# Patient Record
Sex: FEMALE | Race: OTHER | Employment: FULL TIME | URBAN - METROPOLITAN AREA
[De-identification: names, ages, dates, MRNs, and addresses within clinical notes are randomized per-mention and may not be internally consistent; named-entity substitution may affect disease eponyms.]

---

## 2024-04-16 ENCOUNTER — TELEPHONE (OUTPATIENT)
Age: 49
End: 2024-04-16

## 2024-04-16 NOTE — TELEPHONE ENCOUNTER
Pt called in to schedule a new pt appt with Dr. Do. Pt is scheduled for 07/19/2024. Pt is aware and confirmed

## 2024-07-19 ENCOUNTER — OFFICE VISIT (OUTPATIENT)
Dept: ENDOCRINOLOGY | Facility: CLINIC | Age: 49
End: 2024-07-19
Payer: COMMERCIAL

## 2024-07-19 VITALS
WEIGHT: 199 LBS | BODY MASS INDEX: 30.16 KG/M2 | SYSTOLIC BLOOD PRESSURE: 120 MMHG | DIASTOLIC BLOOD PRESSURE: 72 MMHG | HEART RATE: 78 BPM | HEIGHT: 68 IN | OXYGEN SATURATION: 97 %

## 2024-07-19 DIAGNOSIS — E03.9 ACQUIRED HYPOTHYROIDISM: Primary | ICD-10-CM

## 2024-07-19 DIAGNOSIS — E55.9 VITAMIN D DEFICIENCY: ICD-10-CM

## 2024-07-19 DIAGNOSIS — D75.1 POLYCYTHEMIA: ICD-10-CM

## 2024-07-19 PROCEDURE — 99204 OFFICE O/P NEW MOD 45 MIN: CPT | Performed by: INTERNAL MEDICINE

## 2024-07-19 RX ORDER — LEVOTHYROXINE SODIUM 75 MCG
TABLET ORAL
COMMUNITY
Start: 2024-05-28 | End: 2024-07-25 | Stop reason: SDUPTHER

## 2024-07-19 RX ORDER — EPINEPHRINE 0.3 MG/.3ML
0.3 INJECTION SUBCUTANEOUS
COMMUNITY

## 2024-07-19 RX ORDER — ERGOCALCIFEROL 1.25 MG/1
CAPSULE ORAL
COMMUNITY

## 2024-07-19 RX ORDER — LANOLIN ALCOHOL/MO/W.PET/CERES
CREAM (GRAM) TOPICAL DAILY
COMMUNITY

## 2024-07-19 NOTE — ASSESSMENT & PLAN NOTE
- Labs reviewed. At this time she is biochemically euthyroid, however given symptoms, may benefit transitioning to Synthroid.   - Will transition patient to Synthroid 75mcg daily. Samples provided in office.   - She will enroll in [employer] and we will provide prescription when she completes registration as Synthroid has not been covered by her insurance in the past.   - Advised that levotyroxine should be taken on an empty stomach. Should avoid taking medications like iron, calcium, tums, I3jaadrfej, PPI at the same time that may decrease absorption of T4. Should separate administration by 4hrs.  - Advised patient to take Vitamin B12 1000mg daily  - Advised patient to start taking Calcium carbonate 600mg daily  - Advised patient to start taking Vitamin D3 4000 units daily  - Advised patient to take magnesium glycinate 400mg, 1 tablet daily   - Will order thyroid antibodies, celiac panel  - Recheck TFTs in 6 weeks and 4 months.    - Follow up visit in 4 months

## 2024-07-19 NOTE — PROGRESS NOTES
Rosalba Chacon 49 y.o. female MRN: [id number]    Encounter: [id number]      Assessment & Plan   1. Acquired hypothyroidism  Assessment & Plan:  - Labs reviewed. At this time she is biochemically euthyroid, however given symptoms, may benefit transitioning to Synthroid.   - Will transition patient to Synthroid 75mcg daily. Samples provided in office.   - She will enroll in [facility] and we will provide prescription when she completes registration as Synthroid has not been covered by her insurance in the past.   - Advised that levotyroxine should be taken on an empty stomach. Should avoid taking medications like iron, calcium, tums, W6sqfrofwr, PPI at the same time that may decrease absorption of T4. Should separate administration by 4hrs.  - Advised patient to take Vitamin B12 1000mg daily  - Advised patient to start taking Calcium carbonate 600mg daily  - Advised patient to start taking Vitamin D3 4000 units daily  - Advised patient to take magnesium glycinate 400mg, 1 tablet daily   - Will order thyroid antibodies, celiac panel  - Recheck TFTs in 6 weeks and 4 months.    - Follow up visit in 4 months  Orders:  -     Celiac Disease Panel; Future  -     Anti-parietal antibody; Future  -     Anti-thyroglobulin antibody; Future  -     Anti-microsomal antibody; Future  -     TSH, 3rd generation; Future; Expected date: 08/30/2024  -     T4, free; Future; Expected date: 08/30/2024  -     Vitamin D 25 hydroxy; Future; Expected date: 11/19/2024  -     TSH, 3rd generation; Future; Expected date: 11/19/2024  -     T4, free; Future; Expected date: 11/19/2024  2. Polycythemia  Assessment & Plan:  Noted to have elevated Hb and Hct on labs  Endorses history of snoring, but also notes itching and noted to have elevated tryptase.   Has history of anaphylactoid reaction in the past with urticarial symptoms.   Will benefit from evaluation by Hematology for polycytemia vs mastocytosis, although elevated counts could also be  related to undiagnosed GAEL.  Orders:  -     Ambulatory Referral to Hematology / Oncology; Future  3. Vitamin D deficiency  -     Vitamin D 25 hydroxy; Future; Expected date: 11/19/2024          CC:  Hypothyroidism    History of Present Illness     HPI:  Rosalba Chacon is a 49 y.o. female with a PMH of Sjogren's syndrome, hypothyroidism, thyroid nodule, vitamin D deficiency presents for evaluation of hypothyroidism.  Patient was diagnosed with hypothyroidism approximately 15 years ago  Currently on Levothyroxine 75mcg daily. Has not tried Synthroid as it is not covered by her insurance.  She is compliant and takes the medication as prescribed.   She reports a 5-6lb weight gain in the past 6 months, also endorses fatigue, sensitivity to cold, occasional constipation, lethargy and brain fog. Also notes occasional tremors which she attributes to family history of RA. No palpitations, increased sweating, difficulty sleeping, dry skin, hair loss or brittle nails.   Her menses have become irregular over the past year, now every 3-4 months and patient states that she is undergoing menopause. She endorses hot flashes, denies vaginal dryness.     All other systems were reviewed and are negative.    Review of Systems   Constitutional:  Positive for fatigue and unexpected weight change (5-6lbs in the past 6 months). Negative for activity change, appetite change and fever.   HENT:  Negative for congestion, postnasal drip, rhinorrhea, sinus pressure, sore throat, trouble swallowing and voice change.    Eyes:  Negative for photophobia and visual disturbance.   Respiratory:  Negative for cough, chest tightness, shortness of breath and wheezing.         +snoring   Cardiovascular:  Positive for leg swelling (in the setting of varicose veins). Negative for chest pain and palpitations.   Gastrointestinal:  Positive for constipation (occasional). Negative for abdominal distention, abdominal pain, diarrhea, nausea and vomiting.  "  Endocrine: Positive for cold intolerance (cold sensitivity). Negative for heat intolerance, polydipsia and polyuria.   Genitourinary:  Positive for menstrual problem. Negative for difficulty urinating, dysuria, frequency and hematuria.   Musculoskeletal:  Negative for arthralgias, back pain and myalgias.   Skin:  Negative for color change, pallor and rash.   Neurological:  Positive for dizziness (occasional). Negative for facial asymmetry, weakness, light-headedness, numbness and headaches.   Psychiatric/Behavioral:  Negative for agitation, behavioral problems, confusion and dysphoric mood.    All other systems reviewed and are negative.      Historical Information   No past medical history on file.  No past surgical history on file.  Social History   Social History     Substance and Sexual Activity   Alcohol Use Not on file     Social History     Substance and Sexual Activity   Drug Use Not on file     Social History     Tobacco Use   Smoking Status Not on file   Smokeless Tobacco Not on file     Family History: No family history on file.    Meds/Allergies   Current Outpatient Medications   Medication Sig Dispense Refill    EPINEPHrine (EPIPEN) 0.3 mg/0.3 mL SOAJ Inject 0.3 mg into a muscle      Synthroid 75 MCG tablet       vitamin B-12 (VITAMIN B-12) 1,000 mcg tablet Take by mouth daily      Vitamin D, Ergocalciferol, 57755 units CAPS Take by mouth Take once a month       No current facility-administered medications for this visit.     No Known Allergies    Objective   Vitals: Blood pressure 120/72, pulse 78, height 5' 8\" (1.727 m), weight 90.3 kg (199 lb), SpO2 97%.  Physical Exam  Vitals and nursing note reviewed.   Constitutional:       General: She is not in acute distress.     Appearance: Normal appearance. She is not ill-appearing, toxic-appearing or diaphoretic.   HENT:      Head: Normocephalic and atraumatic.      Nose: Nose normal.      Mouth/Throat:      Mouth: Mucous membranes are moist.      " Pharynx: Oropharynx is clear.   Eyes:      General: No scleral icterus.        Right eye: No discharge.         Left eye: No discharge.      Extraocular Movements: Extraocular movements intact.      Conjunctiva/sclera: Conjunctivae normal.   Neck:      Comments: Thyroid not enlarged or tender, no palpable nodules  Cardiovascular:      Rate and Rhythm: Normal rate and regular rhythm.      Pulses: Normal pulses.      Heart sounds: Normal heart sounds. No murmur heard.  Pulmonary:      Effort: Pulmonary effort is normal. No respiratory distress.      Breath sounds: Normal breath sounds. No wheezing, rhonchi or rales.   Abdominal:      General: Abdomen is flat. Bowel sounds are normal. There is no distension.      Palpations: Abdomen is soft.      Tenderness: There is no abdominal tenderness. There is no guarding or rebound.   Musculoskeletal:      Cervical back: Normal range of motion and neck supple.      Right lower leg: Edema present.      Left lower leg: Edema present.      Comments: Trace pitting edema in the lower extremities bilaterally.    Skin:     General: Skin is warm and dry.      Coloration: Skin is not jaundiced or pale.   Neurological:      Mental Status: She is alert and oriented to person, place, and time. Mental status is at baseline.   Psychiatric:         Mood and Affect: Mood normal.         Behavior: Behavior normal.         Thought Content: Thought content normal.         Judgment: Judgment normal.          The history was obtained from the review of the chart, patient.    Lab Results:            Component  Ref Range & Units 7/5/24  6:08 AM 12/22/22  6:32 AM 12/23/21 12:35 PM 6/18/21  7:39 AM 7/10/20  6:57 AM 4/9/19  6:32 AM   TSH  0.45 - 5.33 uIU/mL 0.73 1.66 R 0.02 Low  R, CM 0.79 R 1.63 R, CM 1.23 R, CM        Lab Results   Component Value Date/Time    FREE T4 1.24 (H) 07/05/2024 06:08 AM     Component  Ref Range & Units 7/5/24  6:08 AM   VITAMIN B12  180 - 914 pg/mL 316   CBC AND  "DIFFERENTIAL  Order: [id number]  Component  Ref Range & Units 7/5/24  6:08 AM   Hemoglobin  11.5 - 14.5 g/dL 14.8 High    Hematocrit  35.0 - 43.0 % 44.2 High    WBC  4.0 - 10.0 thou/cmm 5.7   RBC  3.70 - 4.70 mill/cmm 5.16 High    Platelet  140 - 350 thou/cmm 252   MPV  7.5 - 11.3 fL 9.1   MCV  80 - 100 fL 86   MCH  26.0 - 34.0 pg 28.7   MCHC  32.0 - 37.0 g/dL 33.5   RDW  12.0 - 16.0 % 13.2   Differential Type AUTO   Absolute Neutrophils  1.8 - 7.8 thou/cmm 3.8   Absolute Lymphocytes  1.0 - 3.0 thou/cmm 1.3   Absolute Monocytes  0.3 - 1.0 thou/cmm 0.5   Absolute Eosinophils  0.0 - 0.5 thou/cmm 0.1   Absolute Basophils  0.0 - 0.1 thou/cmm 0.0   Neutrophils  % 66   Lymphocytes Manual  % 23   Monocytes  % 8   Eosinophils  % 2   Basophils  % 1       Imaging Studies:       I have personally reviewed pertinent reports.      Portions of the record may have been created with voice recognition software. Occasional wrong word or \"sound a like\" substitutions may have occurred due to the inherent limitations of voice recognition software. Read the chart carefully and recognize, using context, where substitutions have occurred.   "

## 2024-07-19 NOTE — PATIENT INSTRUCTIONS
Please take Vitamin B12 1000mg daily.   Start taking Vitamin D3 4000 ubits daily in the evening  We also recommend taking calcium carbonate (TUMS) 600mg daily with dinner.    Labs were ordered for you during this visit, please get them done at your earliest convenience.  Please also take magnesium glycinate 400mg over the counter 1 tablet daily.   
Ataxic gait

## 2024-07-19 NOTE — ASSESSMENT & PLAN NOTE
Noted to have elevated Hb and Hct on labs  Endorses history of snoring, but also notes itching and noted to have elevated tryptase.   Has history of anaphylactoid reaction in the past with urticarial symptoms.   Will benefit from evaluation by Hematology for polycytemia vs mastocytosis, although elevated counts could also be related to undiagnosed GAEL.

## 2024-07-25 DIAGNOSIS — E03.9 ACQUIRED HYPOTHYROIDISM: Primary | ICD-10-CM

## 2024-07-25 RX ORDER — LEVOTHYROXINE SODIUM 75 MCG
TABLET ORAL
Qty: 90 TABLET | Refills: 3 | Status: SHIPPED | OUTPATIENT
Start: 2024-07-25

## 2024-09-18 ENCOUNTER — TELEPHONE (OUTPATIENT)
Dept: ENDOCRINOLOGY | Facility: CLINIC | Age: 49
End: 2024-09-18

## 2024-09-18 DIAGNOSIS — L29.9 ITCHING: ICD-10-CM

## 2024-09-18 DIAGNOSIS — E03.9 ACQUIRED HYPOTHYROIDISM: Primary | ICD-10-CM

## 2024-09-18 NOTE — TELEPHONE ENCOUNTER
Pt was switched to brand synthroid   Will check TSH and free t4  Also records showed tryptase was elevated in 2021, pt c/o itching 3-4 times a week   Will repeat Tryptase level as well     Sowmya Do

## 2024-09-19 ENCOUNTER — APPOINTMENT (OUTPATIENT)
Dept: LAB | Facility: CLINIC | Age: 49
End: 2024-09-19
Payer: COMMERCIAL

## 2024-09-19 DIAGNOSIS — L29.9 ITCHING: ICD-10-CM

## 2024-09-19 DIAGNOSIS — E55.9 VITAMIN D DEFICIENCY: ICD-10-CM

## 2024-09-19 DIAGNOSIS — E03.9 ACQUIRED HYPOTHYROIDISM: ICD-10-CM

## 2024-09-19 LAB
T4 FREE SERPL-MCNC: 1.13 NG/DL (ref 0.61–1.12)
TSH SERPL DL<=0.05 MIU/L-ACNC: 1.21 UIU/ML (ref 0.45–4.5)

## 2024-09-19 PROCEDURE — 84439 ASSAY OF FREE THYROXINE: CPT

## 2024-09-19 PROCEDURE — 83520 IMMUNOASSAY QUANT NOS NONAB: CPT

## 2024-09-19 PROCEDURE — 36415 COLL VENOUS BLD VENIPUNCTURE: CPT

## 2024-09-19 PROCEDURE — 84443 ASSAY THYROID STIM HORMONE: CPT

## 2024-09-22 ENCOUNTER — TELEPHONE (OUTPATIENT)
Dept: ENDOCRINOLOGY | Facility: CLINIC | Age: 49
End: 2024-09-22

## 2024-09-22 DIAGNOSIS — D75.1 POLYCYTHEMIA: Primary | ICD-10-CM

## 2024-09-22 DIAGNOSIS — L29.9 ITCHING: ICD-10-CM

## 2024-09-22 LAB — TRYPTASE SERPL-MCNC: 7.8 UG/L (ref 2.2–13.2)

## 2024-09-23 NOTE — TELEPHONE ENCOUNTER
Reviewed Tryptase level, it is  normal, however pt was taking Zyrtec which can normalize Tryptase, discussed with pt to stop Zyrtec for 5 days and repeat Tryptase,  Blood work ordered     Sowmya Do

## 2024-09-26 ENCOUNTER — APPOINTMENT (OUTPATIENT)
Dept: LAB | Facility: CLINIC | Age: 49
End: 2024-09-26
Payer: COMMERCIAL

## 2024-09-26 DIAGNOSIS — D75.1 POLYCYTHEMIA: ICD-10-CM

## 2024-09-26 DIAGNOSIS — L29.9 ITCHING: ICD-10-CM

## 2024-09-26 LAB
BASOPHILS # BLD AUTO: 0.04 THOUSANDS/ΜL (ref 0–0.1)
BASOPHILS # BLD AUTO: 0.1 THOU/CMM (ref 0–0.1)
BASOPHILS NFR BLD AUTO: 1 %
BASOPHILS NFR BLD AUTO: 1 % (ref 0–1)
DIFFERENTIAL METHOD BLD: ABNORMAL
EOSINOPHIL # BLD AUTO: 0.1 THOU/CMM (ref 0–0.5)
EOSINOPHIL # BLD AUTO: 0.12 THOUSAND/ΜL (ref 0–0.61)
EOSINOPHIL NFR BLD AUTO: 2 %
EOSINOPHIL NFR BLD AUTO: 2 % (ref 0–6)
ERYTHROCYTE [DISTWIDTH] IN BLOOD BY AUTOMATED COUNT: 12.5 % (ref 11.6–15.1)
ERYTHROCYTE [DISTWIDTH] IN BLOOD BY AUTOMATED COUNT: 13 % (ref 12–16)
HCT VFR BLD AUTO: 45 % (ref 35–43)
HCT VFR BLD AUTO: 46.3 % (ref 34.8–46.1)
HGB BLD-MCNC: 15.2 G/DL (ref 11.5–15.4)
HGB BLD-MCNC: 15.5 G/DL (ref 11.5–14.5)
IMM GRANULOCYTES # BLD AUTO: 0.01 THOUSAND/UL (ref 0–0.2)
IMM GRANULOCYTES NFR BLD AUTO: 0 % (ref 0–2)
LYMPHOCYTES # BLD AUTO: 1.1 THOU/CMM (ref 1–3)
LYMPHOCYTES # BLD AUTO: 1.11 THOUSANDS/ΜL (ref 0.6–4.47)
LYMPHOCYTES NFR BLD AUTO: 15 %
LYMPHOCYTES NFR BLD AUTO: 15 % (ref 14–44)
MCH RBC QN AUTO: 28.5 PG (ref 26.8–34.3)
MCH RBC QN AUTO: 29 PG (ref 26–34)
MCHC RBC AUTO-ENTMCNC: 32.8 G/DL (ref 31.4–37.4)
MCHC RBC AUTO-ENTMCNC: 34.3 G/DL (ref 32–37)
MCV RBC AUTO: 84 FL (ref 80–100)
MCV RBC AUTO: 87 FL (ref 82–98)
MONOCYTES # BLD AUTO: 0.4 THOU/CMM (ref 0.3–1)
MONOCYTES # BLD AUTO: 0.48 THOUSAND/ΜL (ref 0.17–1.22)
MONOCYTES NFR BLD AUTO: 5 %
MONOCYTES NFR BLD AUTO: 7 % (ref 4–12)
NEUTROPHILS # BLD AUTO: 5.47 THOUSANDS/ΜL (ref 1.85–7.62)
NEUTROPHILS # BLD AUTO: 6.1 THOU/CMM (ref 1.8–7.8)
NEUTROPHILS NFR BLD AUTO: 77 %
NEUTS SEG NFR BLD AUTO: 75 % (ref 43–75)
NRBC BLD AUTO-RTO: 0 /100 WBCS
PLATELET # BLD AUTO: 245 THOU/CMM (ref 140–350)
PLATELET # BLD AUTO: 273 THOUSANDS/UL (ref 149–390)
PMV BLD AUTO: 11.2 FL (ref 8.9–12.7)
PMV BLD REES-ECKER: 9.5 FL (ref 7.5–11.3)
RBC # BLD AUTO: 5.33 MILL/CMM (ref 3.7–4.7)
RBC # BLD AUTO: 5.33 MILLION/UL (ref 3.81–5.12)
WBC # BLD AUTO: 7.23 THOUSAND/UL (ref 4.31–10.16)
WBC # BLD AUTO: 7.9 THOU/CMM (ref 4–10)

## 2024-09-26 PROCEDURE — 36415 COLL VENOUS BLD VENIPUNCTURE: CPT

## 2024-09-26 PROCEDURE — 83520 IMMUNOASSAY QUANT NOS NONAB: CPT

## 2024-09-26 PROCEDURE — 85025 COMPLETE CBC W/AUTO DIFF WBC: CPT

## 2024-09-29 LAB
TRYPTASE SERPL-MCNC: 6.7 UG/L (ref 2.2–13.2)
TRYPTASE SERPL-MCNC: 8.2 UG/L

## 2024-11-06 ENCOUNTER — TELEPHONE (OUTPATIENT)
Dept: ENDOCRINOLOGY | Facility: CLINIC | Age: 49
End: 2024-11-06

## 2024-11-07 ENCOUNTER — TELEPHONE (OUTPATIENT)
Dept: ENDOCRINOLOGY | Facility: CLINIC | Age: 49
End: 2024-11-07

## 2024-11-07 ENCOUNTER — TELEMEDICINE (OUTPATIENT)
Dept: ENDOCRINOLOGY | Facility: CLINIC | Age: 49
End: 2024-11-07
Payer: COMMERCIAL

## 2024-11-07 DIAGNOSIS — E53.8 VITAMIN B 12 DEFICIENCY: ICD-10-CM

## 2024-11-07 DIAGNOSIS — K76.0 FATTY LIVER: ICD-10-CM

## 2024-11-07 DIAGNOSIS — E66.9 OBESITY (BMI 30-39.9): Primary | ICD-10-CM

## 2024-11-07 DIAGNOSIS — E03.9 ACQUIRED HYPOTHYROIDISM: ICD-10-CM

## 2024-11-07 DIAGNOSIS — E55.9 VITAMIN D DEFICIENCY: ICD-10-CM

## 2024-11-07 PROCEDURE — 99214 OFFICE O/P EST MOD 30 MIN: CPT | Performed by: INTERNAL MEDICINE

## 2024-11-07 RX ORDER — TIRZEPATIDE 2.5 MG/.5ML
2.5 INJECTION, SOLUTION SUBCUTANEOUS WEEKLY
Qty: 2 ML | Refills: 3 | Status: SHIPPED | OUTPATIENT
Start: 2024-11-07 | End: 2025-02-27

## 2024-11-07 NOTE — PROGRESS NOTES
Virtual Regular Visit  Name: Rosalba Chacon      : 1975      MRN: [id number]  Encounter Provider: Sowmya Do MD  Encounter Date: 2024   Encounter department: [facility] [place]    Verification of patient location:    Patient is located at Home in the following state in which I hold an active license [place]    Assessment & Plan  Obesity (BMI 30-39.9)  She would benefit from glucagon like peptide therapy considering her history of fatty liver as well as BMI of more than 30  Discussed the side effects and benefits of Mounjaro.  Will start Mounjaro 2.5 mg once a week for 4 weeks, then increase the dose to 5 mg once a week after 4 weeks  Pt  has  tried more than 6 months of lifestyle modifications including diet and activity changes and has had insignificant weight loss of less than 1 lb a week. Patient denies personal and family history of MCT and MEN2 tumors. Patient denies personal history of pancreatitis. Side effects discussed but not limited to diarrhea, bloating, constipation, GI upset, heartburn,. Titration and medication administration discussed.      Patient educated on the importance of weight loss and benefits of portion control and dieting.Encouraged whole foods/Mediterranean diet, increasing consumption of vegetables, and avoiding processed/packaged foods/carbs.Patient also educated on the importance of exercise. Encouraged to engage in moderate intensity exercise for 30-50 min at least 3-5 times per week along with glucagon like peptide therapy  Orders:    tirzepatide (Zepbound) 2.5 mg/0.5 mL auto-injector; Inject 0.5 mL (2.5 mg total) under the skin once a week    Acquired hypothyroidism  Continue current dose of Synthroid  Orders:    T4, free; Future    TSH, 3rd generation; Future    Vitamin D deficiency  Continue current dose of vitamin D supplementation 2000 IU daily  Orders:    Vitamin D 25 hydroxy; Future    Vitamin B 12 deficiency  Continue  "vitamin B12 supplementation  Orders:    Vitamin B12; Future    Fatty liver  Start glucagon like peptide therapy, also reinforce dietary changes as well as exercise routine to lose weight which can help with fatty liver         2.  Hypothyroidism-patient is clinically and biochemically euthyroid.  Continue current dose of Synthroid    3.  History of elevated triglycerides-she will need repeat lipid profile by primary care physician      No results found for: \"LDLCALC\"      Encounter provider Sowmya Do MD    The patient was identified by name and date of birth. Roslaba Chacon was informed that this is a telemedicine visit and that the visit is being conducted through the Epic Embedded platform. She agrees to proceed..  My office door was closed. No one else was in the room.  She acknowledged consent and understanding of privacy and security of the video platform. The patient has agreed to participate and understands they can discontinue the visit at any time.    Patient is aware this is a billable service.     History of Present Illness     Rosalba Chacon is a 49 y.o. female who presents with chief complaint of weight gain, and difficulty losing weight despite of trying low-calorie diet, exercise routine such as walking half an hour daily 5 days a week.  She also has history of hypothyroidism for which she takes brand Synthroid 75 mcg daily on empty stomach 1 hour before breakfast  She was referred to hematology oncology as she has complained of itching to rule out mastocytosis however workup was not suggestive of mastocytosis or polycythemia.    Currently she takes vitamin B12 1000 mcg daily, vitamin D 2000 IU daily.  She had abdominal ultrasound which showed fatty liver  She is interested in weight loss medication    mpression:    1. Mild increased hepatic echogenicity reflecting mild fatty infiltration.  2. No cholelithiasis.          Workstation:[id number]  Narrative    History: Polycythemia.    Exam: " Ultrasound of the abdomen complete.    Comparison: None.    Findings:    Liver: The liver is normal in size and measures 13.3 cm in length. Mild  increased hepatic echogenicity reflecting mild fatty infiltration.  Gallbladder: No cholelithiasis. No gallbladder wall thickening or  pericholecystic fluid. Negative reported sonographic Parker's sign.  Bile Ducts: No intra or extrahepatic biliary dilation. The common bile duct is  normal in caliber and measures 3 mm in diameter.  Pancreas: Visualized pancreatic parenchyma appears hyperechoic suggestive of  fatty infiltration. Partially limited evaluation due to bowel gas.  Spleen: Within normal limits. The spleen measures 8.4 cm in length.  Kidneys: Left kidney measures 10.5 cm in length. Right kidney measures 11.7 cm  in length. No evidence of stone or hydronephrosis bilaterally.  Aorta/IVC: The visualized upper abdominal aorta and IVC are normal in caliber.  History obtained from : patient  Review of Systems   Constitutional:  Negative for activity change, diaphoresis, fatigue, fever and unexpected weight change.   HENT: Negative.     Eyes:  Negative for visual disturbance.   Respiratory:  Negative for cough, chest tightness and shortness of breath.    Cardiovascular:  Negative for chest pain, palpitations and leg swelling.   Gastrointestinal:  Negative for abdominal pain, blood in stool, constipation, diarrhea, nausea and vomiting.   Endocrine: Negative for cold intolerance, heat intolerance, polydipsia, polyphagia and polyuria.   Genitourinary:  Negative for dysuria, enuresis, frequency and urgency.   Musculoskeletal:  Negative for arthralgias and myalgias.   Skin:  Negative for pallor, rash and wound.   Allergic/Immunologic: Negative.    Neurological:  Negative for dizziness, tremors, weakness and numbness.   Hematological: Negative.    Psychiatric/Behavioral: Negative.       Pertinent Medical History  fatty liver  Hypothyroidism, elevated triglycerides  Medical  "History Reviewed by provider this encounter:       Past Medical History   History reviewed. No pertinent past medical history.  History reviewed. No pertinent surgical history.  History reviewed. No pertinent family history.  Current Outpatient Medications on File Prior to Visit   Medication Sig Dispense Refill    EPINEPHrine (EPIPEN) 0.3 mg/0.3 mL SOAJ Inject 0.3 mg into a muscle      Synthroid 75 MCG tablet Take 1 tablet daily on empty stomach 1 hour before breakfast and 4-hour apart from vitamin D and calcium supplementation 90 tablet 3    vitamin B-12 (VITAMIN B-12) 1,000 mcg tablet Take by mouth daily      Vitamin D, Ergocalciferol, 54934 units CAPS Take by mouth Take once a month       No current facility-administered medications on file prior to visit.   No Known Allergies       Objective     Ht 5' 8.6\" (1.742 m)   Wt 92.5 kg (204 lb)   BMI 30.48 kg/m²   Physical Exam  Constitutional:       Appearance: Normal appearance.   Musculoskeletal:         General: Normal range of motion.      Cervical back: Normal range of motion and neck supple.   Neurological:      Mental Status: She is alert.         Visit Time  Total Visit Duration: 20 minutes         "

## 2024-11-08 ENCOUNTER — TELEPHONE (OUTPATIENT)
Dept: ENDOCRINOLOGY | Facility: CLINIC | Age: 49
End: 2024-11-08

## 2024-11-08 VITALS — HEIGHT: 69 IN | WEIGHT: 204 LBS | BODY MASS INDEX: 30.21 KG/M2

## 2024-11-08 NOTE — TELEPHONE ENCOUNTER
Reason for call:   [x] Prior Auth  [] Other:     Caller:  [] Patient  [x] Pharmacy  Name: [facility]   Address:   Callback Number:     Medication: Zepbound        Quantity: 2.5mg  Key : [id number]    Ordering Provider:   [] PCP/Provider -   [x] Speciality/Provider - Ernestina          Is the patient's insurance updated in EPIC?   [x] Yes   [] No

## 2024-11-11 NOTE — TELEPHONE ENCOUNTER
PA for Zepbound SUBMITTED to [facility]    via    []CMM-KEY:   [x]Surescripts-Case ID # [id number]  []Availity-Auth ID # NDC #   []Faxed to plan   []Other website   []Phone call Case ID #     [x]PA sent as URGENT    All office notes, labs and other pertaining documents and studies sent. Clinical questions answered. Awaiting determination from insurance company.     Turnaround time for your insurance to make a decision on your Prior Authorization can take 7-21 business days.

## 2024-11-14 NOTE — TELEPHONE ENCOUNTER
PA for Zepbound 2.5mg APPROVED     Date(s) approved 11/12/2024-07/09/2025      Patient advised by          Unable to leave voicemail  [x]MyChart Message  []Phone call   []LMOM  []L/M to call office as no active Communication consent on file  []Unable to leave detailed message as VM not approved on Communication consent       Pharmacy advised by    [x]Fax  []Phone call    Approval letter scanned into Media Yes

## 2024-12-03 ENCOUNTER — PATIENT MESSAGE (OUTPATIENT)
Dept: ENDOCRINOLOGY | Facility: CLINIC | Age: 49
End: 2024-12-03

## 2024-12-03 DIAGNOSIS — E66.9 OBESITY (BMI 30-39.9): Primary | ICD-10-CM

## 2024-12-03 RX ORDER — TIRZEPATIDE 5 MG/.5ML
INJECTION, SOLUTION SUBCUTANEOUS
Qty: 2 ML | Refills: 4 | Status: SHIPPED | OUTPATIENT
Start: 2024-12-03 | End: 2024-12-12 | Stop reason: SDUPTHER

## 2024-12-04 ENCOUNTER — TELEPHONE (OUTPATIENT)
Dept: ENDOCRINOLOGY | Facility: CLINIC | Age: 49
End: 2024-12-04

## 2024-12-04 NOTE — TELEPHONE ENCOUNTER
Reason for call:   [] Refill   [x] Prior Auth  [] Other:     Office:   [] PCP/Provider -   [x] Specialty/Provider - Tish Simpson    Medication: Zepbound     Dose/Frequency: 5 mg weekly     Quantity: 2 ml     Pharmacy: [place]

## 2024-12-05 NOTE — TELEPHONE ENCOUNTER
Patient called and made aware Zepbound is approved. Patient states she will be going on vacation for 3 weeks and not have access to a fridge. Made aware Zepbound can be left unopened at room temperature for up to 21 days as long as temperature does not exceed 86 degrees. Patient verbalized understanding.

## 2024-12-05 NOTE — TELEPHONE ENCOUNTER
PA for Zepbound 5 MG/0.5ML SOLN  APPROVED     Date(s) approved 11/5/24-8/2/25    Case #[id number]    Patient advised by          []MyChart Message  []Phone call   []LMOM  [x]L/M to call office as no active Communication consent on file  []Unable to leave detailed message as VM not approved on Communication consent       Pharmacy advised by    [x]Fax  []Phone call    Approval letter scanned into Media No

## 2024-12-05 NOTE — TELEPHONE ENCOUNTER
PA for Zepbound 5 MG/0.5ML SOLN SUBMITTED to [facility]    via      [x][employer]-Case ID # [id number]      [x]PA sent as URGENT    All office notes, labs and other pertaining documents and studies sent. Clinical questions answered. Awaiting determination from insurance company.     Turnaround time for your insurance to make a decision on your Prior Authorization can take 7-21 business days.

## 2024-12-12 ENCOUNTER — TELEPHONE (OUTPATIENT)
Dept: ENDOCRINOLOGY | Facility: CLINIC | Age: 49
End: 2024-12-12

## 2024-12-12 ENCOUNTER — PATIENT MESSAGE (OUTPATIENT)
Dept: ENDOCRINOLOGY | Facility: CLINIC | Age: 49
End: 2024-12-12

## 2024-12-12 DIAGNOSIS — E66.9 OBESITY (BMI 30-39.9): Primary | ICD-10-CM

## 2024-12-12 DIAGNOSIS — E66.9 OBESITY (BMI 30-39.9): ICD-10-CM

## 2024-12-12 RX ORDER — TIRZEPATIDE 5 MG/.5ML
INJECTION, SOLUTION SUBCUTANEOUS
Qty: 2 ML | Refills: 4 | Status: SHIPPED | OUTPATIENT
Start: 2024-12-12 | End: 2024-12-12 | Stop reason: SDUPTHER

## 2024-12-12 RX ORDER — TIRZEPATIDE 5 MG/.5ML
5 INJECTION, SOLUTION SUBCUTANEOUS WEEKLY
Qty: 6 ML | Refills: 0 | Status: SHIPPED | OUTPATIENT
Start: 2024-12-12 | End: 2024-12-13 | Stop reason: SDUPTHER

## 2024-12-12 RX ORDER — TIRZEPATIDE 5 MG/.5ML
INJECTION, SOLUTION SUBCUTANEOUS
Qty: 2 ML | Refills: 1 | Status: SHIPPED | OUTPATIENT
Start: 2024-12-12 | End: 2024-12-12

## 2024-12-12 NOTE — TELEPHONE ENCOUNTER
Prescription for Zepbound 5 mg, autoinjector was sent to [facility] pharmacy at [facility] as per patient as it was not available in another pharmacy

## 2024-12-13 ENCOUNTER — TELEPHONE (OUTPATIENT)
Dept: ENDOCRINOLOGY | Facility: CLINIC | Age: 49
End: 2024-12-13

## 2024-12-13 DIAGNOSIS — E66.9 OBESITY (BMI 30-39.9): ICD-10-CM

## 2024-12-13 DIAGNOSIS — E66.9 OBESITY (BMI 30-39.9): Primary | ICD-10-CM

## 2024-12-13 RX ORDER — TIRZEPATIDE 5 MG/.5ML
5 INJECTION, SOLUTION SUBCUTANEOUS WEEKLY
Qty: 6 ML | Refills: 1 | Status: SHIPPED | OUTPATIENT
Start: 2024-12-13 | End: 2024-12-13 | Stop reason: ALTCHOICE

## 2024-12-13 RX ORDER — TIRZEPATIDE 5 MG/.5ML
5 INJECTION, SOLUTION SUBCUTANEOUS WEEKLY
Qty: 6 ML | Refills: 1 | Status: SHIPPED | OUTPATIENT
Start: 2024-12-13 | End: 2025-05-30

## 2024-12-13 RX ORDER — PHENTERMINE HYDROCHLORIDE 15 MG/1
15 CAPSULE ORAL EVERY MORNING
Qty: 30 CAPSULE | Refills: 3 | Status: SHIPPED | OUTPATIENT
Start: 2024-12-13 | End: 2024-12-13 | Stop reason: DRUGHIGH

## 2024-12-13 NOTE — TELEPHONE ENCOUNTER
Got a message from patient.  There is a shortage of Zepbound in all other pharmacies. [facility]     Sowmya Do

## 2024-12-13 NOTE — TELEPHONE ENCOUNTER
Spoke with pt   As there is a overall shortage of Zepbound , we will send the Rx for phentermine, 15 mg daily.  Discussed to start with half tablet daily for 2 weeks and then increase the dose to 15 mg daily in the morning.  Also discussed the side effects of medication, and phentermine cannot be taken with Zepbound .  Discussed to avoid driving at least for 1 week after starting phentermine    Sowmya Do

## 2024-12-16 ENCOUNTER — TELEPHONE (OUTPATIENT)
Dept: ENDOCRINOLOGY | Facility: CLINIC | Age: 49
End: 2024-12-16

## 2024-12-16 NOTE — TELEPHONE ENCOUNTER
We need to cancel phentermine as patient has received Zepbound prescription from pharmacy  No need to start prior authorization

## 2024-12-23 ENCOUNTER — TELEPHONE (OUTPATIENT)
Dept: ENDOCRINOLOGY | Facility: CLINIC | Age: 49
End: 2024-12-23

## 2025-01-26 ENCOUNTER — PATIENT MESSAGE (OUTPATIENT)
Dept: ENDOCRINOLOGY | Facility: CLINIC | Age: 50
End: 2025-01-26

## 2025-01-26 DIAGNOSIS — K76.0 FATTY LIVER: ICD-10-CM

## 2025-01-26 DIAGNOSIS — E66.9 OBESITY (BMI 30-39.9): Primary | ICD-10-CM

## 2025-01-27 ENCOUNTER — TELEPHONE (OUTPATIENT)
Dept: ENDOCRINOLOGY | Facility: CLINIC | Age: 50
End: 2025-01-27

## 2025-01-27 RX ORDER — PHENTERMINE HYDROCHLORIDE 15 MG/1
15 CAPSULE ORAL EVERY MORNING
Qty: 30 CAPSULE | Refills: 3 | Status: SHIPPED | OUTPATIENT
Start: 2025-01-27

## 2025-01-27 NOTE — PATIENT COMMUNICATION
Spoke with patient, she stated that she was having side effects to the Zepbound such as constipation, stomach discomfort.  We discussed about starting phentermine, discussed the side effects and benefits  We will try low-dose phentermine 15 mg daily for 3 months and give drug holiday for a few months  Patient is agreeable.  Prescription was sent to [facility] pharmacy

## 2025-01-29 ENCOUNTER — TELEPHONE (OUTPATIENT)
Dept: ENDOCRINOLOGY | Facility: CLINIC | Age: 50
End: 2025-01-29

## 2025-01-31 ENCOUNTER — TELEPHONE (OUTPATIENT)
Dept: ENDOCRINOLOGY | Facility: CLINIC | Age: 50
End: 2025-01-31

## 2025-01-31 NOTE — TELEPHONE ENCOUNTER
Is patient still on Zepbound? I remember from December the provider stated no phentermine due to patient on Zepbound    Please advise

## 2025-02-13 ENCOUNTER — TELEPHONE (OUTPATIENT)
Age: 50
End: 2025-02-13

## 2025-02-14 NOTE — TELEPHONE ENCOUNTER
PA for phentermine 15 MG SUBMITTED to [facility]    via    [][employer]-Case ID # [id number]  []PA sent as URGENT    All office notes, labs and other pertaining documents and studies sent. Clinical questions answered. Awaiting determination from insurance company.     Turnaround time for your insurance to make a decision on your Prior Authorization can take 7-21 business days.

## 2025-02-21 NOTE — TELEPHONE ENCOUNTER
PA for phentermine 15 MG  DENIED    Reason:        Message sent to office clinical pool Yes    Denial letter scanned into Media Yes    Appeal started No (Provider will need to decide if appeal is warranted and send clinical documentation to Prior Authorization Team for initiation.)    **Please follow up with your patient regarding denial and next steps**

## 2025-02-25 ENCOUNTER — EVALUATION (OUTPATIENT)
Dept: PHYSICAL THERAPY | Facility: CLINIC | Age: 50
End: 2025-02-25
Payer: COMMERCIAL

## 2025-02-25 DIAGNOSIS — M25.561 ACUTE PAIN OF RIGHT KNEE: Primary | ICD-10-CM

## 2025-02-25 PROCEDURE — 97112 NEUROMUSCULAR REEDUCATION: CPT | Performed by: PHYSICAL THERAPIST

## 2025-02-25 PROCEDURE — 97161 PT EVAL LOW COMPLEX 20 MIN: CPT | Performed by: PHYSICAL THERAPIST

## 2025-02-25 NOTE — LETTER
2025    Kentrell Brunner MD  [address]  [address]  [facility] [address]    Patient: Rosalba Chacon   YOB: 1975   Date of Visit: 2025     Encounter Diagnosis     ICD-10-CM    1. Acute pain of right knee  M25.561           Dear Dr. Brunner:    Thank you for your recent referral of Rosalba Chacon. Please review the attached evaluation summary from Rosalba's recent visit.     Please verify that you agree with the plan of care by signing the attached order.     If you have any questions or concerns, please do not hesitate to call.     I sincerely appreciate the opportunity to share in the care of one of your patients and hope to have another opportunity to work with you in the near future.       Sincerely,    Andrew Buenrostro, PT      Referring Provider:      I certify that I have read the below Plan of Care and certify the need for these services furnished under this plan of treatment while under my care.                    Kentrell Brunner MD  [address]  [address]  [facility] [address]  Via Fax: [phone]          PT Evaluation     Today's date: 2025  Patient name: Rosalba Chacon  : 1975  MRN: [id number]  Referring provider: Kentrell Brunner MD  Dx:   Encounter Diagnosis     ICD-10-CM    1. Acute pain of right knee  M25.561                      Assessment  Impairments: abnormal muscle firing, abnormal muscle tone, abnormal or restricted ROM, abnormal movement, activity intolerance, impaired balance, impaired physical strength, lacks appropriate home exercise program, pain with function, weight-bearing intolerance, participation limitations and activity limitations  Symptom irritability: moderate    Assessment details: 49 year old female patient reports to PT with B (L>R) knee pain. Patient ambulating with one axillary crutch. No red flags noted. Patient presents with decreased motor coordination of LE musculature as well as decreased L knee ROM as primary  movement impairments. Patient had MRI of both knees which ligamentous injuries primarily in her L knee. Patient opting for non-op at this point. Patient will benefit from skilled PT services to address current impairments and functional limitations to help patient return to her PLOF.       Understanding of Dx/Px/POC: good     Prognosis: good    Goals  STG  1. Patient will be independent with completion of HEP throughout therapy  2. Patient will have full L knee ROM in 4 weeks.  3. Patient will ambulate with normal gait pattern and TKE in 4 weeks.  LTG    1. Patient will stand for prolonged periods without increase in symptoms so patient can complete more household tasks with less difficulty in 6 weeks.  2. Patient will ambulate for prolonged periods without increase in symptoms so patient can navigate throughout the community with less difficulty in 6 weeks.   3. Patient will navigate stairs without increase in difficulty in 6 weeks.     Plan  Patient would benefit from: skilled physical therapy    Planned therapy interventions: abdominal trunk stabilization, activity modification, IASTM, joint mobilization, balance, manual therapy, motor coordination training, neuromuscular re-education, patient/caregiver education, strengthening, stretching, therapeutic activities, therapeutic exercise, home exercise program, functional ROM exercises, flexibility and gait training    Frequency: 2x week  Duration in weeks: 8  Treatment plan discussed with: patient        Subjective Evaluation    History of Present Illness  Mechanism of injury: Patient reports with B knee pain (L>R) from a fall on 2/1/25. Patient ambulating with axillary crutch. Patient is hopeful to get back to dancing.     Patient had MRI which showed for the left knee Status post ACL reconstruction with full-thickness tear of the graft.   Anterior displacement of torn graft fibers along the joint space. Associated   contusions within posterior margin of the right  anterior lateral femoral   condyle. Contusion far medial aspect of medial femoral condyle, Grade 1/2 MCL sprain, Grade 2 LCL sprain with partial tears of the proximal fibers, Posterior lateral corner injury with tearing of popliteal fibular ligament   and posterior lateral corner edema, Moderate knee joint effusion and small Baker's cyst formation.     Patient had MRI for right knee that showed Status post ACL reconstruction, for suspicious for a torn graft near the femoral origin, Partial-thickness patellofemoral chondromalacia, progressive from prior MRI from 2016, Mild proximal patellar tendinosis, without tear, similar .       Patient Goals  Patient goals for therapy: decreased pain, increased motion, improved balance, return to sport/leisure activities, independence with ADLs/IADLs and increased strength    Pain  Current pain ratin  At best pain ratin  At worst pain ratin  Quality: dull ache  Relieving factors: change in position and ice  Aggravating factors: standing, walking, sitting and stair climbing    Treatments  Previous treatment: injection treatment  Current treatment: physical therapy        Objective     Active Range of Motion   Left Knee   Flexion: 110 degrees with pain  Extension: Left knee active extension: lacking 5 deg. with pain    Right Knee   Normal active range of motion    Passive Range of Motion   Left Knee   Flexion: 112 degrees with pain  Extension: Left knee passive extension: lacking 3 deg.     Right Knee   Normal passive range of motion    Strength/Myotome Testing     Left Knee   Quadriceps contraction: good    Right Knee   Quadriceps contraction: good             Precautions: SEE SUBJECTIVE FOR EXTENSIVE KNEE INJURY HISTORY/CURRENT INJURY      Manuals 2/25            L knee PROM             Patellar mobs             B knee mx work As needed                         Neuro Re-Ed             Swing/stance                                                                                            Ther Ex             L heel slides r            L knee extension stretch seated r            SLR r                         bike             Leg press              Supine hip flexor/quad stretch                          Ther Activity                                       Gait Training                                       Modalities

## 2025-02-25 NOTE — PROGRESS NOTES
PT Evaluation     Today's date: 2025  Patient name: Rosalba Chacon  : 1975  MRN: [id number]  Referring provider: Kentrell Brunner MD  Dx:   Encounter Diagnosis     ICD-10-CM    1. Acute pain of right knee  M25.561                      Assessment  Impairments: abnormal muscle firing, abnormal muscle tone, abnormal or restricted ROM, abnormal movement, activity intolerance, impaired balance, impaired physical strength, lacks appropriate home exercise program, pain with function, weight-bearing intolerance, participation limitations and activity limitations  Symptom irritability: moderate    Assessment details: 49 year old female patient reports to PT with B (L>R) knee pain. Patient ambulating with one axillary crutch. No red flags noted. Patient presents with decreased motor coordination of LE musculature as well as decreased L knee ROM as primary movement impairments. Patient had MRI of both knees which ligamentous injuries primarily in her L knee. Patient opting for non-op at this point. Patient will benefit from skilled PT services to address current impairments and functional limitations to help patient return to her PLOF.       Understanding of Dx/Px/POC: good     Prognosis: good    Goals  STG  1. Patient will be independent with completion of HEP throughout therapy  2. Patient will have full L knee ROM in 4 weeks.  3. Patient will ambulate with normal gait pattern and TKE in 4 weeks.  LTG    1. Patient will stand for prolonged periods without increase in symptoms so patient can complete more household tasks with less difficulty in 6 weeks.  2. Patient will ambulate for prolonged periods without increase in symptoms so patient can navigate throughout the community with less difficulty in 6 weeks.   3. Patient will navigate stairs without increase in difficulty in 6 weeks.     Plan  Patient would benefit from: skilled physical therapy    Planned therapy interventions: abdominal trunk stabilization,  activity modification, IASTM, joint mobilization, balance, manual therapy, motor coordination training, neuromuscular re-education, patient/caregiver education, strengthening, stretching, therapeutic activities, therapeutic exercise, home exercise program, functional ROM exercises, flexibility and gait training    Frequency: 2x week  Duration in weeks: 8  Treatment plan discussed with: patient        Subjective Evaluation    History of Present Illness  Mechanism of injury: Patient reports with B knee pain (L>R) from a fall on 25. Patient ambulating with axillary crutch. Patient is hopeful to get back to dancing.     Patient had MRI which showed for the left knee Status post ACL reconstruction with full-thickness tear of the graft.   Anterior displacement of torn graft fibers along the joint space. Associated   contusions within posterior margin of the right anterior lateral femoral   condyle. Contusion far medial aspect of medial femoral condyle, Grade 1/2 MCL sprain, Grade 2 LCL sprain with partial tears of the proximal fibers, Posterior lateral corner injury with tearing of popliteal fibular ligament   and posterior lateral corner edema, Moderate knee joint effusion and small Baker's cyst formation.     Patient had MRI for right knee that showed Status post ACL reconstruction, for suspicious for a torn graft near the femoral origin, Partial-thickness patellofemoral chondromalacia, progressive from prior MRI from 2016, Mild proximal patellar tendinosis, without tear, similar .       Patient Goals  Patient goals for therapy: decreased pain, increased motion, improved balance, return to sport/leisure activities, independence with ADLs/IADLs and increased strength    Pain  Current pain ratin  At best pain ratin  At worst pain ratin  Quality: dull ache  Relieving factors: change in position and ice  Aggravating factors: standing, walking, sitting and stair climbing    Treatments  Previous treatment:  injection treatment  Current treatment: physical therapy        Objective     Active Range of Motion   Left Knee   Flexion: 110 degrees with pain  Extension: Left knee active extension: lacking 5 deg. with pain    Right Knee   Normal active range of motion    Passive Range of Motion   Left Knee   Flexion: 112 degrees with pain  Extension: Left knee passive extension: lacking 3 deg.     Right Knee   Normal passive range of motion    Strength/Myotome Testing     Left Knee   Quadriceps contraction: good    Right Knee   Quadriceps contraction: good             Precautions: SEE SUBJECTIVE FOR EXTENSIVE KNEE INJURY HISTORY/CURRENT INJURY      Manuals 2/25            L knee PROM             Patellar mobs             B knee mx work As needed                         Neuro Re-Ed             Swing/stance                                                                                           Ther Ex             L heel slides r            L knee extension stretch seated r            SLR r                         bike             Leg press              Supine hip flexor/quad stretch                          Ther Activity                                       Gait Training                                       Modalities

## 2025-03-04 ENCOUNTER — OFFICE VISIT (OUTPATIENT)
Dept: PHYSICAL THERAPY | Facility: CLINIC | Age: 50
End: 2025-03-04
Payer: COMMERCIAL

## 2025-03-04 DIAGNOSIS — M25.561 ACUTE PAIN OF RIGHT KNEE: Primary | ICD-10-CM

## 2025-03-04 PROCEDURE — 97110 THERAPEUTIC EXERCISES: CPT | Performed by: PHYSICAL THERAPIST

## 2025-03-04 PROCEDURE — 97112 NEUROMUSCULAR REEDUCATION: CPT | Performed by: PHYSICAL THERAPIST

## 2025-03-04 PROCEDURE — 97140 MANUAL THERAPY 1/> REGIONS: CPT | Performed by: PHYSICAL THERAPIST

## 2025-03-04 NOTE — PROGRESS NOTES
"Daily Note     Today's date: 3/4/2025  Patient name: Rosalba Chacon  : 1975  MRN: [id number]  Referring provider: Kentrell Brunner MD  Dx:   Encounter Diagnosis     ICD-10-CM    1. Acute pain of right knee  M25.561                      Subjective: Patient reports knee hurts more when leg is straight. Patient also has more pain at night. Tried brace but didn't feel right on her leg.      Objective: See treatment diary below      Assessment: Tolerated treatment well. Patient would benefit from continued PT. Treatment plan initiated.      Plan: Progress treatment as tolerated.       Precautions: SEE SUBJECTIVE FOR EXTENSIVE KNEE INJURY HISTORY/CURRENT INJURY      Manuals 2/25 3/4           L knee PROM  MK           Patellar mobs             B knee mx work As needed MK                        Neuro Re-Ed             Swing/stance                                                                                           Ther Ex             L heel slides r            L knee extension stretch seated r            SLR r 2x10                        bike  Nustep 5 min           Leg press   2x10 45 lbs            Supine hip flexor/quad stretch  3x 20\" holds            prostretch  3x20\" holds           LAQ  5x 6\" holds            Ther Activity             Step up/down  15x 4\" each                        Gait Training                                       Modalities                                            "

## 2025-03-06 ENCOUNTER — OFFICE VISIT (OUTPATIENT)
Dept: PHYSICAL THERAPY | Facility: CLINIC | Age: 50
End: 2025-03-06
Payer: COMMERCIAL

## 2025-03-06 DIAGNOSIS — M25.561 ACUTE PAIN OF RIGHT KNEE: Primary | ICD-10-CM

## 2025-03-06 PROCEDURE — 97112 NEUROMUSCULAR REEDUCATION: CPT | Performed by: PHYSICAL THERAPIST

## 2025-03-06 PROCEDURE — 97140 MANUAL THERAPY 1/> REGIONS: CPT | Performed by: PHYSICAL THERAPIST

## 2025-03-06 PROCEDURE — 97110 THERAPEUTIC EXERCISES: CPT | Performed by: PHYSICAL THERAPIST

## 2025-03-06 NOTE — PROGRESS NOTES
"Daily Note     Today's date: 3/6/2025  Patient name: Rosalba Chacon  : 1975  MRN: [id number]  Referring provider: Kentrell Brunner MD  Dx:   Encounter Diagnosis     ICD-10-CM    1. Acute pain of right knee  M25.561                      Subjective: Patient reports a little sore after last visit and stepped off elliptical onto L LE and had some pain.      Objective: See treatment diary below      Assessment: Tolerated treatment well. Patient would benefit from continued PT. Treatment plan progressed.      Plan: Progress treatment as tolerated.       Precautions: SEE SUBJECTIVE FOR EXTENSIVE KNEE INJURY HISTORY/CURRENT INJURY      Manuals 2/25 3/4 3/6          L knee PROM  MK MK          Patellar mobs   MK OP and CP          B knee mx work As needed MK                        Neuro Re-Ed             Swing/stance             Sassy hip   5x 10\" holds                                                                           Ther Ex             L heel slides r            L knee extension stretch seated r            SLR r 2x10 3x10                       bike  Nustep 5 min 5 min           Leg press   2x10 45 lbs  2x12 45 lbs          Supine hip flexor/quad stretch  3x 20\" holds  3x30\" holds          prostretch  3x20\" holds 3x30\" holds          LAQ  5x 6\" holds  6x 8\" holds           Ther Activity             Step up/down  15x 4\" each 20x 6\" up and down                       Gait Training                                       Modalities                                            "

## 2025-03-11 ENCOUNTER — OFFICE VISIT (OUTPATIENT)
Dept: PHYSICAL THERAPY | Facility: CLINIC | Age: 50
End: 2025-03-11
Payer: COMMERCIAL

## 2025-03-11 DIAGNOSIS — M25.561 ACUTE PAIN OF RIGHT KNEE: Primary | ICD-10-CM

## 2025-03-11 PROCEDURE — 97530 THERAPEUTIC ACTIVITIES: CPT | Performed by: PHYSICAL THERAPIST

## 2025-03-11 PROCEDURE — 97110 THERAPEUTIC EXERCISES: CPT | Performed by: PHYSICAL THERAPIST

## 2025-03-11 PROCEDURE — 97112 NEUROMUSCULAR REEDUCATION: CPT | Performed by: PHYSICAL THERAPIST

## 2025-03-11 PROCEDURE — 97140 MANUAL THERAPY 1/> REGIONS: CPT | Performed by: PHYSICAL THERAPIST

## 2025-03-11 NOTE — PROGRESS NOTES
"Daily Note     Today's date: 3/11/2025  Patient name: Rosalba Chacon  : 1975  MRN: [id number]  Referring provider: Kentrell Brunner MD  Dx:   Encounter Diagnosis     ICD-10-CM    1. Acute pain of right knee  M25.561                      Subjective: Patient reports Friday and Saturday were good and she went for a walk on Saturday and  walked 2 miles. Monday she had increased in symptoms as she had increased pain and stiffness.      Objective: See treatment diary below      Assessment: Tolerated treatment well. Patient would benefit from continued PT. Treatment plan progressed.    Limited AROM knee extension with LAQ at end of treatment.    Plan: Progress treatment as tolerated.       Precautions: SEE SUBJECTIVE FOR EXTENSIVE KNEE INJURY HISTORY/CURRENT INJURY      Manuals 2/25 3/4 3/6 3/11         L knee PROM  MK MK MK         Patellar mobs   MK OP and CP MK OP          B knee mx work As needed MK  MK hamstring                      Neuro Re-Ed             Swing/stance             Sassy hip   5x 10\" holds 8x 10\" holds                                                                           Ther Ex             L heel slides r            L knee extension stretch seated r            SLR r 2x10 3x10 2x12 2 lbs                      bike  Nustep 5 min 5 min  5 min         Leg press   2x10 45 lbs  2x12 45 lbs 2x12 55 lbs         Supine hip flexor/quad stretch  3x 20\" holds  3x30\" holds 3x30\" holds         prostretch  3x20\" holds 3x30\" holds 3x30\" holds         LAQ  5x 6\" holds  6x 8\" holds  2x10 5 lbs B         Ther Activity             Step up/down  15x 4\" each 20x 6\" up and down 10x 8\" each leg up, 10x 8\" down L LE                       Gait Training                                       Modalities                                            "

## 2025-03-13 ENCOUNTER — OFFICE VISIT (OUTPATIENT)
Dept: PHYSICAL THERAPY | Facility: CLINIC | Age: 50
End: 2025-03-13
Payer: COMMERCIAL

## 2025-03-13 DIAGNOSIS — M25.561 ACUTE PAIN OF RIGHT KNEE: Primary | ICD-10-CM

## 2025-03-13 PROCEDURE — 97110 THERAPEUTIC EXERCISES: CPT | Performed by: PHYSICAL THERAPIST

## 2025-03-13 PROCEDURE — 97530 THERAPEUTIC ACTIVITIES: CPT | Performed by: PHYSICAL THERAPIST

## 2025-03-13 PROCEDURE — 97112 NEUROMUSCULAR REEDUCATION: CPT | Performed by: PHYSICAL THERAPIST

## 2025-03-13 PROCEDURE — 97140 MANUAL THERAPY 1/> REGIONS: CPT | Performed by: PHYSICAL THERAPIST

## 2025-03-13 NOTE — PROGRESS NOTES
"Daily Note     Today's date: 3/13/2025  Patient name: Rosalba Chacon  : 1975  MRN: [id number]  Referring provider: Kentrell Brunner MD  Dx:   Encounter Diagnosis     ICD-10-CM    1. Acute pain of right knee  M25.561                      Subjective: Patient stated minimal pain prior to treatment session.       Objective: See treatment diary below      Assessment: Patient demonstrated appropriate level challenge with all activities; no c/o at completion of treatment session.       Plan: Progress treatment as tolerated.       Precautions: SEE SUBJECTIVE FOR EXTENSIVE KNEE INJURY HISTORY/CURRENT INJURY      Manuals 2/25 3/4 3/6 3/11 3/13        L knee PROM  MK MK MK KK        Patellar mobs   MK OP and CP MK OP  KK        B knee mx work As needed MK  MK hamstring KK                     Neuro Re-Ed             Swing/stance             Sassy hip   5x 10\" holds 8x 10\" holds                                                                           Ther Ex             L heel slides r            L knee extension stretch seated r            SLR r 2x10 3x10 2x12 2 lbs 2x12 2 lbs                     bike  Nustep 5 min 5 min  5 min 5 min        Leg press   2x10 45 lbs  2x12 45 lbs 2x12 55 lbs 2x12 55 lbs        Supine hip flexor/quad stretch  3x 20\" holds  3x30\" holds 3x30\" holds 3x30\" holds        prostretch  3x20\" holds 3x30\" holds 3x30\" holds 3x30\" holds        LAQ  5x 6\" holds  6x 8\" holds  2x10 5 lbs B 2x10 5 lbs B        Ther Activity             Step up/down  15x 4\" each 20x 6\" up and down 10x 8\" each leg up, 10x 8\" down L LE  2x10 ea.  8\"                     Gait Training                                       Modalities                                            " 97.5

## 2025-03-18 ENCOUNTER — OFFICE VISIT (OUTPATIENT)
Dept: PHYSICAL THERAPY | Facility: CLINIC | Age: 50
End: 2025-03-18
Payer: COMMERCIAL

## 2025-03-18 DIAGNOSIS — M25.561 ACUTE PAIN OF RIGHT KNEE: Primary | ICD-10-CM

## 2025-03-18 PROCEDURE — 97140 MANUAL THERAPY 1/> REGIONS: CPT

## 2025-03-18 PROCEDURE — 97112 NEUROMUSCULAR REEDUCATION: CPT

## 2025-03-18 PROCEDURE — 97530 THERAPEUTIC ACTIVITIES: CPT

## 2025-03-18 PROCEDURE — 97110 THERAPEUTIC EXERCISES: CPT

## 2025-03-18 NOTE — PROGRESS NOTES
"Daily Note     Today's date: 3/18/2025  Patient name: Rosalba Chacon  : 1975  MRN: [id number]  Referring provider: Kentrell Brunner MD  Dx:   Encounter Diagnosis     ICD-10-CM    1. Acute pain of right knee  M25.561             Start Time: 1745  Stop Time: 1835  Total time in clinic (min): 50 minutes    Subjective: Pt reports walking 1.5 miles twice this weekend and had no change in soreness. Yesterday she said her knee felt \"normal\" and was able to be up and walking, doing chores around the house.         Objective: See treatment diary below      Assessment: Pt reported some pain today at beginning of session but had no change with exercises throughout session. Pt demonstrated good knee ROM and quad activation but is still limited with quad strength, pain, and ECC quad control. HS curls were added today to further strengthen and support the knee joint and pt was able to complete without pain. Pt will continue to benefit from skilled PT to address remaining impairments to return to PLOF.        Plan: Progress treatment as tolerated.       Precautions: SEE SUBJECTIVE FOR EXTENSIVE KNEE INJURY HISTORY/CURRENT INJURY      Manuals 2/25 3/4 3/6 3/11 3/13 3/18       L knee PROM  MK MK MK KK MP       Patellar mobs   MK OP and CP MK OP  KK MP       B knee mx work As needed MK  MK hamstring KK MP HS                    Neuro Re-Ed             Swing/stance             Sassy hip   5x 10\" holds 8x 10\" holds   3x 10\" holds                                                                        Ther Ex             L heel slides r            L knee extension stretch seated r            SLR r 2x10 3x10 2x12 2 lbs 2x12 2 lbs 2x12 2 lbs                    bike  Nustep 5 min 5 min  5 min 5 min 5 min       Leg press   2x10 45 lbs  2x12 45 lbs 2x12 55 lbs 2x12 55 lbs 2x12 55 lbs       Supine hip flexor/quad stretch  3x 20\" holds  3x30\" holds 3x30\" holds 3x30\" holds 3x30\" holds L       prostretch  3x20\" holds 3x30\" holds 3x30\" holds " "3x30\" holds 3x30\" holds L       LAQ  5x 6\" holds  6x 8\" holds  2x10 5 lbs B 2x10 5 lbs B 2x10 5 lbs B       Standing hs curl      2x10 2#        Ther Activity             Step up/down  15x 4\" each 20x 6\" up and down 10x 8\" each leg up, 10x 8\" down L LE  2x10 ea.  8\" 2x10 ea.  8\"                    Gait Training                                       Modalities                                            "

## 2025-03-20 ENCOUNTER — OFFICE VISIT (OUTPATIENT)
Dept: PHYSICAL THERAPY | Facility: CLINIC | Age: 50
End: 2025-03-20
Payer: COMMERCIAL

## 2025-03-20 DIAGNOSIS — M25.561 ACUTE PAIN OF RIGHT KNEE: Primary | ICD-10-CM

## 2025-03-20 PROCEDURE — 97110 THERAPEUTIC EXERCISES: CPT | Performed by: PHYSICAL THERAPIST

## 2025-03-20 PROCEDURE — 97530 THERAPEUTIC ACTIVITIES: CPT | Performed by: PHYSICAL THERAPIST

## 2025-03-20 PROCEDURE — 97112 NEUROMUSCULAR REEDUCATION: CPT | Performed by: PHYSICAL THERAPIST

## 2025-03-20 PROCEDURE — 97140 MANUAL THERAPY 1/> REGIONS: CPT | Performed by: PHYSICAL THERAPIST

## 2025-03-25 ENCOUNTER — OFFICE VISIT (OUTPATIENT)
Dept: PHYSICAL THERAPY | Facility: CLINIC | Age: 50
End: 2025-03-25
Payer: COMMERCIAL

## 2025-03-25 DIAGNOSIS — M25.561 ACUTE PAIN OF RIGHT KNEE: Primary | ICD-10-CM

## 2025-03-25 PROCEDURE — 97110 THERAPEUTIC EXERCISES: CPT | Performed by: PHYSICAL THERAPIST

## 2025-03-25 PROCEDURE — 97140 MANUAL THERAPY 1/> REGIONS: CPT | Performed by: PHYSICAL THERAPIST

## 2025-03-25 PROCEDURE — 97530 THERAPEUTIC ACTIVITIES: CPT | Performed by: PHYSICAL THERAPIST

## 2025-03-25 PROCEDURE — 97112 NEUROMUSCULAR REEDUCATION: CPT | Performed by: PHYSICAL THERAPIST

## 2025-03-25 NOTE — PROGRESS NOTES
"Daily Note     Today's date: 3/25/2025  Patient name: Rosalba Chacon  : 1975  MRN: [id number]  Referring provider: Kentrell Brunner MD  Dx:   Encounter Diagnosis     ICD-10-CM    1. Acute pain of right knee  M25.561                        Subjective: Patient reports felt pretty good after last PT session. Yesterday did a lot more standing to try and get back into a work mode and today is in more pain.       Objective: See treatment diary below      Assessment: Patient tolerated treatment well. Pt will continue to benefit from skilled PT to address remaining impairments to return to PLOF.        Plan: Progress treatment as tolerated.       Precautions: SEE SUBJECTIVE FOR EXTENSIVE KNEE INJURY HISTORY/CURRENT INJURY      Manuals 2/25 3/6 3/11 3/13 3/18 3/20 3/25     L knee PROM  MK MK KK MP MK MK     Patellar mobs  MK OP and CP MK OP  KK MP MK CP MK OP     B knee mx work As needed  MK hamstring KK MP HS MK quad                  Neuro Re-Ed            Swing/stance            Sassy hip  5x 10\" holds 8x 10\" holds   3x 10\" holds -      SLS       3x10\" holds                                                     Ther Ex            L heel slides r           L knee extension stretch seated r           SLR r 3x10 2x12 2 lbs 2x12 2 lbs 2x12 2 lbs 2x10 3 lbs 2x10 4 lbs                 bike  5 min  5 min 5 min 5 min 5 min 5 min      Leg press   2x12 45 lbs 2x12 55 lbs 2x12 55 lbs 2x12 55 lbs 2x12 55 lbs 3x10 55 lbs     Supine hip flexor/quad stretch  3x30\" holds 3x30\" holds 3x30\" holds 3x30\" holds L 3x30\" holds 3x30\" holds     prostretch  3x30\" holds 3x30\" holds 3x30\" holds 3x30\" holds L 3x30\" holds L 3x30\" holds L     LAQ  6x 8\" holds  2x10 5 lbs B 2x10 5 lbs B 2x10 5 lbs B 2x10 5 lbs B 2x12 5 lbs      Standing hs curl     2x10 2#  -      Ther Activity            Step up/down  20x 6\" up and down 10x 8\" each leg up, 10x 8\" down L LE  2x10 ea.  8\" 2x10 ea.  8\" 2x10 B 8\" 2x8 6\" down                 Gait Training                 "                    Modalities

## 2025-03-27 ENCOUNTER — APPOINTMENT (OUTPATIENT)
Dept: PHYSICAL THERAPY | Facility: CLINIC | Age: 50
End: 2025-03-27
Payer: COMMERCIAL

## 2025-03-28 ENCOUNTER — OFFICE VISIT (OUTPATIENT)
Dept: PHYSICAL THERAPY | Facility: CLINIC | Age: 50
End: 2025-03-28
Payer: COMMERCIAL

## 2025-03-28 DIAGNOSIS — M25.561 ACUTE PAIN OF RIGHT KNEE: Primary | ICD-10-CM

## 2025-03-28 PROCEDURE — 97112 NEUROMUSCULAR REEDUCATION: CPT

## 2025-03-28 PROCEDURE — 97140 MANUAL THERAPY 1/> REGIONS: CPT

## 2025-03-28 PROCEDURE — 97110 THERAPEUTIC EXERCISES: CPT

## 2025-03-28 PROCEDURE — 97530 THERAPEUTIC ACTIVITIES: CPT

## 2025-03-28 NOTE — PROGRESS NOTES
"Daily Note     Today's date: 3/28/2025  Patient name: Rosalba Chacon  : 1975  MRN: [id number]  Referring provider: Kentrell Brunner MD  Dx:   Encounter Diagnosis     ICD-10-CM    1. Acute pain of right knee  M25.561             Start Time: 0720  Stop Time: 0800  Total time in clinic (min): 40 minutes    Subjective: Pt reports no new knee soreness since last visit.      Objective: See treatment diary below      Assessment: Pt continues to respond well to strengthening with minimal pain. She would continue to benefit from progressive strengthening to get her back to PLOF and return to higher level activity without limitation.      Plan: Progress treatment as tolerated.       Precautions: SEE SUBJECTIVE FOR EXTENSIVE KNEE INJURY HISTORY/CURRENT INJURY      Manuals 2/25 3/6 3/11 3/13 3/18 3/20 3/25 3/28    L knee PROM  MK MK KK MP MK MK MP    Patellar mobs  MK OP and CP MK OP  KK MP MK CP MK OP MP OP    B knee mx work As needed  MK hamstring KK MP HS MK quad                  Neuro Re-Ed            Swing/stance            Sassy hip  5x 10\" holds 8x 10\" holds   3x 10\" holds -      SLS       3x10\" holds 3x10\" holds                                                    Ther Ex            L heel slides r           L knee extension stretch seated r           SLR r 3x10 2x12 2 lbs 2x12 2 lbs 2x12 2 lbs 2x10 3 lbs 2x10 4 lbs 2x10 4 lbs                bike  5 min  5 min 5 min 5 min 5 min 5 min  5 min    Leg press   2x12 45 lbs 2x12 55 lbs 2x12 55 lbs 2x12 55 lbs 2x12 55 lbs 3x10 55 lbs 3x10 55 lbs    Supine hip flexor/quad stretch  3x30\" holds 3x30\" holds 3x30\" holds 3x30\" holds L 3x30\" holds 3x30\" holds 3x30\" holds    prostretch  3x30\" holds 3x30\" holds 3x30\" holds 3x30\" holds L 3x30\" holds L 3x30\" holds L 3x30\" holds L    LAQ  6x 8\" holds  2x10 5 lbs B 2x10 5 lbs B 2x10 5 lbs B 2x10 5 lbs B 2x12 5 lbs  2x12 5 lbs     Standing hs curl     2x10 2#  -      Ther Activity            Step up/down  20x 6\" up and down 10x 8\" each " "leg up, 10x 8\" down L LE  2x10 ea.  8\" 2x10 ea.  8\" 2x10 B 8\" 2x8 6\" down 2x8 6\" down                Gait Training                                    Modalities                                         "

## 2025-04-01 ENCOUNTER — OFFICE VISIT (OUTPATIENT)
Dept: PHYSICAL THERAPY | Facility: CLINIC | Age: 50
End: 2025-04-01
Payer: COMMERCIAL

## 2025-04-01 DIAGNOSIS — M25.561 ACUTE PAIN OF RIGHT KNEE: Primary | ICD-10-CM

## 2025-04-01 PROCEDURE — 97140 MANUAL THERAPY 1/> REGIONS: CPT | Performed by: PHYSICAL THERAPIST

## 2025-04-01 PROCEDURE — 97110 THERAPEUTIC EXERCISES: CPT | Performed by: PHYSICAL THERAPIST

## 2025-04-01 PROCEDURE — 97530 THERAPEUTIC ACTIVITIES: CPT | Performed by: PHYSICAL THERAPIST

## 2025-04-01 NOTE — PROGRESS NOTES
"Daily Note     Today's date: 2025  Patient name: Rosalba Chacon  : 1975  MRN: [id number]  Referring provider: Kentrell Brunner MD  Dx:   Encounter Diagnosis     ICD-10-CM    1. Acute pain of right knee  M25.561                        Subjective: Patient reports did a lot yesterday and started to feel it in her knee. Had more pain last night. This morning is good but most mornings are good.    Objective: See treatment diary below      Assessment: Patient would continue to benefit from progressive strengthening to get her back to PLOF and return to higher level activity without limitation.      Plan: Progress treatment as tolerated.       Precautions: SEE SUBJECTIVE FOR EXTENSIVE KNEE INJURY HISTORY/CURRENT INJURY      Manuals 2/25 3/13 3/18 3/20 3/25 3/28 4/1   L knee PROM  KK MP MK MK MP MK   Patellar mobs  KK MP MK CP MK OP MP OP MK and CP    B knee mx work As needed KK MP HS MK quad                Neuro Re-Ed          Swing/stance          Sassy hip   3x 10\" holds -      SLS     3x10\" holds 3x10\" holds 3x 15\" holds                                            Ther Ex          L heel slides r         L knee extension stretch seated r         SLR r 2x12 2 lbs 2x12 2 lbs 2x10 3 lbs 2x10 4 lbs 2x10 4 lbs 2x12 4 lbs             bike  5 min 5 min 5 min 5 min  5 min 5 min    Leg press   2x12 55 lbs 2x12 55 lbs 2x12 55 lbs 3x10 55 lbs 3x10 55 lbs 2x12 65 lbs    Supine hip flexor/quad stretch  3x30\" holds 3x30\" holds L 3x30\" holds 3x30\" holds 3x30\" holds 3x30\" holds    prostretch  3x30\" holds 3x30\" holds L 3x30\" holds L 3x30\" holds L 3x30\" holds L 3x30\" holds L    LAQ  2x10 5 lbs B 2x10 5 lbs B 2x10 5 lbs B 2x12 5 lbs  2x12 5 lbs  3x10 5 lbs    Johann hamstring   2x10 2#  -   2x10 6 lbs    Ther Activity          Step up/down  2x10 ea.  8\" 2x10 ea.  8\" 2x10 B 8\" 2x8 6\" down 2x8 6\" down 2x8 6\" down    squats       Wall squats 2x10    Gait Training                              Modalities                            "

## 2025-04-03 ENCOUNTER — OFFICE VISIT (OUTPATIENT)
Dept: PHYSICAL THERAPY | Facility: CLINIC | Age: 50
End: 2025-04-03
Payer: COMMERCIAL

## 2025-04-03 DIAGNOSIS — M25.561 ACUTE PAIN OF RIGHT KNEE: Primary | ICD-10-CM

## 2025-04-03 PROCEDURE — 97110 THERAPEUTIC EXERCISES: CPT

## 2025-04-03 PROCEDURE — 97140 MANUAL THERAPY 1/> REGIONS: CPT

## 2025-04-03 PROCEDURE — 97530 THERAPEUTIC ACTIVITIES: CPT

## 2025-04-03 NOTE — PROGRESS NOTES
"Daily Note     Today's date: 4/3/2025  Patient name: Rosalba Chacon  : 1975  MRN: [id number]  Referring provider: Kentrell Brunner MD  Dx:   Encounter Diagnosis     ICD-10-CM    1. Acute pain of right knee  M25.561                      Subjective: going back to work went well, 8-9 hours on her feet without any issues.       Objective: See treatment diary below      Assessment: Tolerated treatment well. Early onset fatigue with quad strengthening tasks. Unable to complete SLS without UE support. Cuing to LE alignment with wall squats and leg press to minimize knee valgus collapse. Cuing for eccentric control wit johann HS strengthening.  Patient demonstrated fatigue post session and would benefit from continued PT.          Plan: Continue per plan of care.       Precautions: SEE SUBJECTIVE FOR EXTENSIVE KNEE INJURY HISTORY/CURRENT INJURY      Manuals 2/25 3/13 3/18 3/20 3/25 3/28 4/1 4/3   L knee PROM  KK MP MK MK MP MK MB   Patellar mobs  KK MP MK CP MK OP MP OP MK and CP  MB- ROM   B knee mx work As needed KK MP HS MK quad                  Neuro Re-Ed           Swing/stance           Sassy hip   3x 10\" holds -       SLS     3x10\" holds 3x10\" holds 3x 15\" holds  3x15\" hold                                               Ther Ex           L heel slides r          L knee extension stretch seated r          SLR r 2x12 2 lbs 2x12 2 lbs 2x10 3 lbs 2x10 4 lbs 2x10 4 lbs 2x12 4 lbs 2x12 4 lbs              bike  5 min 5 min 5 min 5 min  5 min 5 min  5 min lvl 1   Leg press   2x12 55 lbs 2x12 55 lbs 2x12 55 lbs 3x10 55 lbs 3x10 55 lbs 2x12 65 lbs  3x10 65 lbs   Supine hip flexor/quad stretch  3x30\" holds 3x30\" holds L 3x30\" holds 3x30\" holds 3x30\" holds 3x30\" holds  3x30\" holds    prostretch  3x30\" holds 3x30\" holds L 3x30\" holds L 3x30\" holds L 3x30\" holds L 3x30\" holds L  3x30\" holds L   LAQ  2x10 5 lbs B 2x10 5 lbs B 2x10 5 lbs B 2x12 5 lbs  2x12 5 lbs  3x10 5 lbs  3x10 5 lbs    Johann hamstring   2x10 2#  -   2x10 6 " "lbs  2x10 6lb sitting on AE   Ther Activity           Step up/down  2x10 ea.  8\" 2x10 ea.  8\" 2x10 B 8\" 2x8 6\" down 2x8 6\" down 2x8 6\" down  2x8 6\" down    squats       Wall squats 2x10  Wall squats 2x10    Gait Training                                 Modalities                                        "

## 2025-04-08 ENCOUNTER — OFFICE VISIT (OUTPATIENT)
Dept: PHYSICAL THERAPY | Facility: CLINIC | Age: 50
End: 2025-04-08
Payer: COMMERCIAL

## 2025-04-08 DIAGNOSIS — M25.561 ACUTE PAIN OF RIGHT KNEE: Primary | ICD-10-CM

## 2025-04-08 LAB
25(OH)D3+25(OH)D2 SERPL-MCNC: 48 NG/ML (ref 30–100)
BASOPHILS # BLD AUTO: 0.1 THOU/CMM (ref 0–0.1)
BASOPHILS NFR BLD AUTO: 1 %
DIFFERENTIAL METHOD BLD: ABNORMAL
EOSINOPHIL # BLD AUTO: 0.1 THOU/CMM (ref 0–0.5)
EOSINOPHIL NFR BLD AUTO: 2 %
ERYTHROCYTE [DISTWIDTH] IN BLOOD BY AUTOMATED COUNT: 12.9 % (ref 12–16)
HCT VFR BLD AUTO: 43.9 % (ref 35–43)
HGB BLD-MCNC: 14.8 G/DL (ref 11.5–14.5)
LYMPHOCYTES # BLD AUTO: 1.2 THOU/CMM (ref 1–3)
LYMPHOCYTES NFR BLD AUTO: 17 %
MCH RBC QN AUTO: 29.1 PG (ref 26–34)
MCHC RBC AUTO-ENTMCNC: 33.6 G/DL (ref 32–37)
MCV RBC AUTO: 87 FL (ref 80–100)
MONOCYTES # BLD AUTO: 0.4 THOU/CMM (ref 0.3–1)
MONOCYTES NFR BLD AUTO: 7 %
NEUTROPHILS # BLD AUTO: 4.9 THOU/CMM (ref 1.8–7.8)
NEUTROPHILS NFR BLD AUTO: 73 %
PLATELET # BLD AUTO: 275 THOU/CMM (ref 140–350)
PMV BLD REES-ECKER: 9 FL (ref 7.5–11.3)
RBC # BLD AUTO: 5.07 MILL/CMM (ref 3.7–4.7)
T4 FREE SERPL-MCNC: 1.19 NG/DL (ref 0.61–1.12)
TSH SERPL-ACNC: 1.52 UIU/ML (ref 0.45–5.33)
VIT B12 SERPL-MCNC: 528 PG/ML (ref 180–914)
WBC # BLD AUTO: 6.7 THOU/CMM (ref 4–10)

## 2025-04-08 PROCEDURE — 97110 THERAPEUTIC EXERCISES: CPT

## 2025-04-08 PROCEDURE — 97140 MANUAL THERAPY 1/> REGIONS: CPT

## 2025-04-08 PROCEDURE — 97112 NEUROMUSCULAR REEDUCATION: CPT

## 2025-04-08 PROCEDURE — 97530 THERAPEUTIC ACTIVITIES: CPT

## 2025-04-08 NOTE — PROGRESS NOTES
"Daily Note     Today's date: 2025  Patient name: Rosalba Chacon  : 1975  MRN: [id number]  Referring provider: Kentrell Brunner MD  Dx:   Encounter Diagnosis     ICD-10-CM    1. Acute pain of right knee  M25.561                      Subjective: Pt has decided to pursue surgery at [facility] to repair ACL despite overall decrease in pain, but only with significant modifications.    Objective: See treatment diary below     Precautions: SEE SUBJECTIVE FOR EXTENSIVE KNEE INJURY HISTORY/CURRENT INJURY    Manuals 3/20 3/25 3/28 4/1 4/3 4/8      L knee PROM MK MK MP MK MB SH      Patellar mobs MK CP MK OP MP OP MK and CP  MB- ROM SH      B knee mx work MK quad                       Neuro Re-Ed                                    SLS  3x10\" holds 3x10\" holds 3x 15\" holds  3x15\" hold 20\"x3                  Ther Ex            SLR 2x10 3 lbs 2x10 4 lbs 2x10 4 lbs 2x12 4 lbs 2x12 4 lbs 4# 2x10 w/QS      Prone donkey kicks      15x L      S/l bicycles      15x L      bike 5 min 5 min  5 min 5 min  5 min lvl 1 5' L1      Leg press  2x12 55 lbs 3x10 55 lbs 3x10 55 lbs 2x12 65 lbs  3x10 65 lbs 75# 2x10      Supine hip flexor/ quad stretch 3x30\" holds 3x30\" holds 3x30\" holds 3x30\" holds  3x30\" holds  30\"x3 L prone      prostretch 3x30\" holds L 3x30\" holds L 3x30\" holds L 3x30\" holds L  3x30\" holds L       LAQ 2x10 5 lbs B 2x12 5 lbs  2x12 5 lbs  3x10 5 lbs  3x10 5 lbs  7# 3\"x20      [place] hamstring -   2x10 6 lbs  2x10 6lb sitting on AE       Ther Activity            Step up/down 2x10 B 8\" 2x8 6\" down 2x8 6\" down 2x8 6\" down  2x8 6\" down  -      squats    Wall squats 2x10  Wall squats 2x10                                   Assessment: Tolerated treatment well. Patient demonstrated fatigue post treatment, exhibited good technique with therapeutic exercises, and would benefit from continued PT    Plan: Continue per plan of care.  Progress treatment as tolerated.     Rhoda Monique    "

## 2025-04-09 ENCOUNTER — RESULTS FOLLOW-UP (OUTPATIENT)
Dept: ENDOCRINOLOGY | Facility: CLINIC | Age: 50
End: 2025-04-09

## 2025-04-10 ENCOUNTER — OFFICE VISIT (OUTPATIENT)
Dept: PHYSICAL THERAPY | Facility: CLINIC | Age: 50
End: 2025-04-10
Payer: COMMERCIAL

## 2025-04-10 ENCOUNTER — TELEMEDICINE (OUTPATIENT)
Dept: ENDOCRINOLOGY | Facility: CLINIC | Age: 50
End: 2025-04-10
Payer: COMMERCIAL

## 2025-04-10 ENCOUNTER — TELEPHONE (OUTPATIENT)
Dept: ENDOCRINOLOGY | Facility: CLINIC | Age: 50
End: 2025-04-10

## 2025-04-10 DIAGNOSIS — E66.9 OBESITY (BMI 30-39.9): Primary | ICD-10-CM

## 2025-04-10 DIAGNOSIS — Z13.220 LIPID SCREENING: ICD-10-CM

## 2025-04-10 DIAGNOSIS — E53.8 VITAMIN B 12 DEFICIENCY: ICD-10-CM

## 2025-04-10 DIAGNOSIS — M25.561 ACUTE PAIN OF RIGHT KNEE: Primary | ICD-10-CM

## 2025-04-10 DIAGNOSIS — E55.9 VITAMIN D DEFICIENCY: ICD-10-CM

## 2025-04-10 DIAGNOSIS — Z13.1 DIABETES MELLITUS SCREENING: ICD-10-CM

## 2025-04-10 DIAGNOSIS — D75.1 POLYCYTHEMIA: ICD-10-CM

## 2025-04-10 DIAGNOSIS — E03.9 ACQUIRED HYPOTHYROIDISM: ICD-10-CM

## 2025-04-10 PROCEDURE — 97112 NEUROMUSCULAR REEDUCATION: CPT | Performed by: PHYSICAL THERAPIST

## 2025-04-10 PROCEDURE — 97110 THERAPEUTIC EXERCISES: CPT | Performed by: PHYSICAL THERAPIST

## 2025-04-10 PROCEDURE — 99214 OFFICE O/P EST MOD 30 MIN: CPT | Performed by: INTERNAL MEDICINE

## 2025-04-10 PROCEDURE — 97140 MANUAL THERAPY 1/> REGIONS: CPT | Performed by: PHYSICAL THERAPIST

## 2025-04-10 PROCEDURE — 97530 THERAPEUTIC ACTIVITIES: CPT | Performed by: PHYSICAL THERAPIST

## 2025-04-10 RX ORDER — LEVOTHYROXINE SODIUM 75 MCG
TABLET ORAL
Qty: 90 TABLET | Refills: 3 | Status: SHIPPED | OUTPATIENT
Start: 2025-04-10

## 2025-04-10 RX ORDER — PHENTERMINE HYDROCHLORIDE 30 MG/1
30 CAPSULE ORAL EVERY MORNING
Qty: 30 CAPSULE | Refills: 3 | Status: SHIPPED | OUTPATIENT
Start: 2025-04-10

## 2025-04-10 RX ORDER — IBUPROFEN 200 MG
200 TABLET ORAL EVERY 6 HOURS PRN
COMMUNITY

## 2025-04-10 RX ORDER — MELOXICAM 15 MG/1
15 TABLET ORAL DAILY
COMMUNITY
Start: 2025-02-24

## 2025-04-10 NOTE — PROGRESS NOTES
Virtual Regular VisitName: Rosalba Chacon      : 1975      MRN: [id number]  Encounter Provider: Sowmya Do MD  Encounter Date: 4/10/2025   Encounter department: [facility] [place]  :  Assessment & Plan  Acquired hypothyroidism  Lab Results   Component Value Date    [id number] 1.206 2024    TSH 1.52 2025   Patient is clinically and biochemically euthyroid.  Continue current dose of Synthroid  Follow-up blood work in 3 months and again in 6 months, patient is currently on weight loss medication and may lose weight in future that is why we are  going to check TSH and free T4 close lets as her dose may change    Orders:    Synthroid 75 MCG tablet; Take 1 tablet daily on empty stomach 1 hour before breakfast and 4-hour apart from vitamin D and calcium supplementation    T4, free; Future    TSH, 3rd generation; Future    T4, free; Future    TSH, 3rd generation; Future    Obesity (BMI 30-39.9)  Currently she is taking phentermine 15 mg daily for last 1 month, has not lost weight.  She is trying to exercise regularly, follows low-calorie diet.  Will increase dose of phentermine to 30 mg daily  Educated about side effects and call if she notices any such as palpitations, headaches flushing  Also discussed phentermine is approved for 3 months use  If there is no weight loss we will switch to topiramate/phentermine combination    Orders:    phentermine 30 MG capsule; Take 1 capsule (30 mg total) by mouth every morning    Hemoglobin A1C; Future    Vitamin D deficiency  Continue vitamin D3 supplementation 5000 IU daily  Orders:    Vitamin D 25 hydroxy; Future    Basic metabolic panel; Future    Vitamin B 12 deficiency  Continue vitamin B12 1000 mcg 5 days a week  Orders:    Vitamin B12; Future    Polycythemia  He was evaluated by hematology oncology  CBC stable  Orders:    CBC and differential; Future    Lipid screening  Obtain lipid profile before next  appointment  Orders:    Lipid panel; Future    Diabetes mellitus screening  Obtain hemoglobin A1c before next appointment  Orders:    Hemoglobin A1C; Future    Acquired hypothyroidism         Obesity (BMI 30-39.9)               History of Present Illness     HPI  Review of Systems   Constitutional:  Negative for activity change, diaphoresis, fatigue, fever and unexpected weight change.   HENT: Negative.     Eyes:  Negative for visual disturbance.   Respiratory:  Negative for cough, chest tightness and shortness of breath.    Cardiovascular:  Negative for chest pain, palpitations and leg swelling.   Gastrointestinal:  Negative for abdominal pain, blood in stool, constipation, diarrhea, nausea and vomiting.   Endocrine: Negative for cold intolerance, heat intolerance, polydipsia, polyphagia and polyuria.   Genitourinary:  Negative for dysuria, enuresis, frequency and urgency.   Musculoskeletal:  Positive for arthralgias and myalgias.   Skin:  Negative for pallor, rash and wound.   Allergic/Immunologic: Negative.    Neurological:  Negative for dizziness, tremors, weakness and numbness.   Hematological: Negative.    Psychiatric/Behavioral: Negative.         Objective   There were no vitals taken for this visit.    Physical Exam  Constitutional:       Appearance: Normal appearance.   Pulmonary:      Effort: Pulmonary effort is normal.   Musculoskeletal:      Cervical back: Normal range of motion.   Neurological:      General: No focal deficit present.      Mental Status: She is alert and oriented to person, place, and time.   Psychiatric:         Mood and Affect: Mood normal.         Behavior: Behavior normal.         Administrative Statements   Encounter provider Sowmya Do MD    The Patient is located at Home and in the following state in which I hold an active license [place].    The patient was identified by name and date of birth. Rosalba Chacon was informed that this is a telemedicine visit and that the visit is  being conducted through the Epic Embedded platform. She agrees to proceed..  My office door was closed. No one else was in the room.  She acknowledged consent and understanding of privacy and security of the video platform. The patient has agreed to participate and understands they can discontinue the visit at any time.    I have spent a total time of 20  minutes in caring for this patient on the day of the visit/encounter including Diagnostic results, Prognosis, Risks and benefits of tx options, Instructions for management, Patient and family education, Importance of tx compliance, Risk factor reductions, Impressions, Counseling / Coordination of care, Documenting in the medical record, Reviewing/placing orders in the medical record (including tests, medications, and/or procedures), and Obtaining or reviewing history  , not including the time spent for establishing the audio/video connection.

## 2025-04-10 NOTE — TELEPHONE ENCOUNTER
Called pt and spoke to her and set up follow visit with Dr Do on oct 9 and pt said didn't need appt card or after visit summary   And pt is aware of fasting labs week of July 10 and week of oct 1st

## 2025-04-10 NOTE — PROGRESS NOTES
"Daily Note     Today's date: 4/10/2025  Patient name: Rosalba Chacon  : 1975  MRN: [id number]  Referring provider: Kentrell Brunner MD  Dx:   Encounter Diagnosis     ICD-10-CM    1. Acute pain of right knee  M25.561                      Subjective: Patient reports feels she is able to do more with her R knee but still feels like her knee is not stable.    Objective: See treatment diary below     Precautions: SEE SUBJECTIVE FOR EXTENSIVE KNEE INJURY HISTORY/CURRENT INJURY    Manuals 4/1 4/3 4/8 4/10     L knee PROM MK MB SH Mk     Patellar mobs MK and CP  MB- ROM SH MK     B knee mx work                  Neuro Re-Ed                           SLS 3x 15\" holds  3x15\" hold 20\"x3 3x 25\" holds              Ther Ex         SLR 2x12 4 lbs 2x12 4 lbs 4# 2x10 w/QS 2x10 5 lbs      Prone donkey kicks   15x L -     S/l bicycles   15x L -     bike 5 min  5 min lvl 1 5' L1 5 min     Leg press  2x12 65 lbs  3x10 65 lbs 75# 2x10 3x10 75 lbs     Supine hip flexor/ quad stretch 3x30\" holds  3x30\" holds  30\"x3 L prone 3x30\" holds      prostretch 3x30\" holds L  3x30\" holds L       LAQ 3x10 5 lbs  3x10 5 lbs  7# 3\"x20 2x12 7.5 lbs      [place] hamstring 2x10 6 lbs  2x10 6lb sitting on AE  2x11 7.5 lbs      Ther Activity         Step up/down 2x8 6\" down  2x8 6\" down  - 2x10 6\" down      squats Wall squats 2x10  Wall squats 2x10   2x10 wall squats                           Assessment: Tolerated treatment well. Patient demonstrated fatigue post treatment, exhibited good technique with therapeutic exercises, and would benefit from continued PT    Plan: Continue per plan of care.  Progress treatment as tolerated.     Andrew Buenrostro    "

## 2025-04-11 NOTE — ASSESSMENT & PLAN NOTE
Lab Results   Component Value Date    [id number] 1.206 09/19/2024    TSH 1.52 04/08/2025   Patient is clinically and biochemically euthyroid.  Continue current dose of Synthroid  Follow-up blood work in 3 months and again in 6 months, patient is currently on weight loss medication and may lose weight in future that is why we are  going to check TSH and free T4 close lets as her dose may change    Orders:    Synthroid 75 MCG tablet; Take 1 tablet daily on empty stomach 1 hour before breakfast and 4-hour apart from vitamin D and calcium supplementation    T4, free; Future    TSH, 3rd generation; Future    T4, free; Future    TSH, 3rd generation; Future

## 2025-04-15 ENCOUNTER — OFFICE VISIT (OUTPATIENT)
Dept: PHYSICAL THERAPY | Facility: CLINIC | Age: 50
End: 2025-04-15
Payer: COMMERCIAL

## 2025-04-15 DIAGNOSIS — M25.561 ACUTE PAIN OF RIGHT KNEE: Primary | ICD-10-CM

## 2025-04-15 PROCEDURE — 97112 NEUROMUSCULAR REEDUCATION: CPT | Performed by: PHYSICAL THERAPIST

## 2025-04-15 PROCEDURE — 97140 MANUAL THERAPY 1/> REGIONS: CPT | Performed by: PHYSICAL THERAPIST

## 2025-04-15 PROCEDURE — 97110 THERAPEUTIC EXERCISES: CPT | Performed by: PHYSICAL THERAPIST

## 2025-04-15 NOTE — PROGRESS NOTES
"Daily Note     Today's date: 4/15/2025  Patient name: Rosalba Chacon  : 1975  MRN: [id number]  Referring provider: Kentrell Brunner MD  Dx:   Encounter Diagnosis     ICD-10-CM    1. Acute pain of right knee  M25.561                      Subjective: Patient reports knees were painful/sore . Also getting heel pain in L foot.    Objective: See treatment diary below     Precautions: SEE SUBJECTIVE FOR EXTENSIVE KNEE INJURY HISTORY/CURRENT INJURY    Manuals 4/3 4/8 4/10 4/15    L knee PROM MB [facility] MK    Patellar mobs MB- ROM [facility] MK    B knee mx work                Neuro Re-Ed        Hip hikes                SLS 3x15\" hold 20\"x3 3x 25\" holds 3x 25\" holds            Ther Ex        SLR 2x12 4 lbs 4# 2x10 w/QS 2x10 5 lbs  2x12    Prone donkey kicks  15x L -     S/l bicycles  15x L -     bike 5 min lvl 1 5' L1 5 min 5 min    Leg press  3x10 65 lbs 75# 2x10 3x10 75 lbs 3x10 65 lbs    Supine hip flexor/ quad stretch 3x30\" holds  30\"x3 L prone 3x30\" holds  3x30\" holds     prostretch 3x30\" holds L       LAQ 3x10 5 lbs  7# 3\"x20 2x12 7.5 lbs  3x10 7.5 lbs    [place] hamstring 2x10 6lb sitting on AE  2x11 7.5 lbs  3x10 7.5 lbs     Ther Activity        Step up/down 2x8 6\" down  - 2x10 6\" down  2x12 6\" down    squats Wall squats 2x10   2x10 wall squats  2x10 wall squats                       Assessment: Tolerated treatment well. Patient demonstrated fatigue post treatment, exhibited good technique with therapeutic exercises, and would benefit from continued PT    Plan: Continue per plan of care.  Progress treatment as tolerated.     Andrew Buenrostro"

## 2025-04-17 ENCOUNTER — OFFICE VISIT (OUTPATIENT)
Dept: PHYSICAL THERAPY | Facility: CLINIC | Age: 50
End: 2025-04-17
Payer: COMMERCIAL

## 2025-04-17 DIAGNOSIS — M25.561 ACUTE PAIN OF RIGHT KNEE: Primary | ICD-10-CM

## 2025-04-17 PROCEDURE — 97140 MANUAL THERAPY 1/> REGIONS: CPT | Performed by: PHYSICAL THERAPIST

## 2025-04-17 PROCEDURE — 97110 THERAPEUTIC EXERCISES: CPT | Performed by: PHYSICAL THERAPIST

## 2025-04-17 PROCEDURE — 97530 THERAPEUTIC ACTIVITIES: CPT | Performed by: PHYSICAL THERAPIST

## 2025-04-17 PROCEDURE — 97112 NEUROMUSCULAR REEDUCATION: CPT | Performed by: PHYSICAL THERAPIST

## 2025-04-17 NOTE — PROGRESS NOTES
"Daily Note     Today's date: 2025  Patient name: Rosalba Chacon  : 1975  MRN: [id number]  Referring provider: Kentrell Brunner MD  Dx:   Encounter Diagnosis     ICD-10-CM    1. Acute pain of right knee  M25.561                      Subjective: Patient reports knee felt good today but also went into work which her knee normally feels better.    Objective: See treatment diary below     Educated to complete balance exercises and some strengthening exercises at home    Precautions: SEE SUBJECTIVE FOR EXTENSIVE KNEE INJURY HISTORY/CURRENT INJURY    Manuals 4/8 4/10 4/15 4/17   L knee PROM SH Mk MK MK   Patellar mobs SH MK MK MK   B knee mx work              Neuro Re-Ed       Hip hikes              SLS 20\"x3 3x 25\" holds 3x 25\" holds 3x20\" holds grn           Ther Ex       SLR 4# 2x10 w/QS 2x10 5 lbs  2x12 2x12 5 lbs   Prone donkey kicks 15x L -     S/l bicycles 15x L -     bike 5' L1 5 min 5 min 5 min   Leg press  75# 2x10 3x10 75 lbs 3x10 65 lbs 2x12 85 lbs   Supine hip flexor/ quad stretch 30\"x3 L prone 3x30\" holds  3x30\" holds  3x30\" holds   prostretch       LAQ 7# 3\"x20 2x12 7.5 lbs  3x10 7.5 lbs 2x12 10 lbs    [place] hamstring  2x11 7.5 lbs  3x10 7.5 lbs     Ther Activity       Step up/down - 2x10 6\" down  2x12 6\" down 2x12 6\" down    squats  2x10 wall squats  2x10 wall squats 2x12                     Assessment: Tolerated treatment well. Patient demonstrated fatigue post treatment, exhibited good technique with therapeutic exercises, and would benefit from continued PT    Plan: Continue per plan of care.  Progress treatment as tolerated.     Andrew Buenrostro    "

## 2025-04-24 ENCOUNTER — PATIENT MESSAGE (OUTPATIENT)
Dept: ENDOCRINOLOGY | Facility: CLINIC | Age: 50
End: 2025-04-24

## 2025-04-24 ENCOUNTER — APPOINTMENT (OUTPATIENT)
Dept: PHYSICAL THERAPY | Facility: CLINIC | Age: 50
End: 2025-04-24
Attending: ORTHOPAEDIC SURGERY
Payer: COMMERCIAL

## 2025-04-24 DIAGNOSIS — A69.20 LYME DISEASE, UNSPECIFIED: ICD-10-CM

## 2025-04-24 DIAGNOSIS — M35.00 SJOGREN'S SYNDROME, WITH UNSPECIFIED ORGAN INVOLVEMENT (HCC): Primary | ICD-10-CM

## 2025-04-24 DIAGNOSIS — A69.23 LYME ARTHRITIS (HCC): ICD-10-CM

## 2025-04-24 NOTE — PATIENT COMMUNICATION
Referral placed for rheumatology, as pt is experiencing joint pains, Lyme titer is positive  Also previously SSA antibodies are positive suggestive of Sjogren syndrome

## 2025-04-29 ENCOUNTER — OFFICE VISIT (OUTPATIENT)
Dept: PHYSICAL THERAPY | Facility: CLINIC | Age: 50
End: 2025-04-29
Attending: ORTHOPAEDIC SURGERY
Payer: COMMERCIAL

## 2025-04-29 DIAGNOSIS — M25.561 ACUTE PAIN OF RIGHT KNEE: Primary | ICD-10-CM

## 2025-04-29 PROCEDURE — 97110 THERAPEUTIC EXERCISES: CPT | Performed by: PHYSICAL THERAPIST

## 2025-04-29 PROCEDURE — 97112 NEUROMUSCULAR REEDUCATION: CPT | Performed by: PHYSICAL THERAPIST

## 2025-04-29 PROCEDURE — 97140 MANUAL THERAPY 1/> REGIONS: CPT | Performed by: PHYSICAL THERAPIST

## 2025-04-29 NOTE — PROGRESS NOTES
"Daily Note     Today's date: 2025  Patient name: Rosalba Chacon  : 1975  MRN: [id number]  Referring provider: Kentrell Brunner MD  Dx:   Encounter Diagnosis     ICD-10-CM    1. Acute pain of right knee  M25.561                      Subjective: Patient reports started antibiotics for lyme disease. Her MD is unsure if she actually tore her ACL. Still getting pain in L heel.     Objective: See treatment diary below     Educated to complete balance exercises and some strengthening exercises at home    Precautions: SEE SUBJECTIVE FOR EXTENSIVE KNEE INJURY HISTORY/CURRENT INJURY    Manuals 4/10 4/15 4/17 4/29   L knee PROM Mk MK MK MK   Patellar mobs MK MK MK MK   B knee mx work              Neuro Re-Ed       Hip hikes              SLS 3x 25\" holds 3x 25\" holds 3x20\" holds grn  3x20\" holds blue           Ther Ex       SLR 2x10 5 lbs  2x12 2x12 5 lbs 3x10 5 lbs   Prone donkey kicks -      S/l bicycles -      bike 5 min 5 min 5 min 5 min   Leg press  3x10 75 lbs 3x10 65 lbs 2x12 85 lbs -   Supine hip flexor/ quad stretch 3x30\" holds  3x30\" holds  3x30\" holds 3x30\" holds   prostretch       LAQ 2x12 7.5 lbs  3x10 7.5 lbs 2x12 10 lbs  3x10 10 lbs    Johann hamstring 2x11 7.5 lbs  3x10 7.5 lbs      Ther Activity       Step up/down 2x10 6\" down  2x12 6\" down 2x12 6\" down     squats 2x10 wall squats  2x10 wall squats 2x12     Lateral step downs    2x10 4\"              Assessment: Tolerated treatment well. Patient demonstrated fatigue post treatment, exhibited good technique with therapeutic exercises, and would benefit from continued PT    Plan: Continue per plan of care.  Progress treatment as tolerated.     Andrew Buenrostro    "

## 2025-05-01 ENCOUNTER — OFFICE VISIT (OUTPATIENT)
Dept: PHYSICAL THERAPY | Facility: CLINIC | Age: 50
End: 2025-05-01
Attending: ORTHOPAEDIC SURGERY
Payer: COMMERCIAL

## 2025-05-01 DIAGNOSIS — M25.561 ACUTE PAIN OF RIGHT KNEE: Primary | ICD-10-CM

## 2025-05-01 PROCEDURE — 97110 THERAPEUTIC EXERCISES: CPT | Performed by: PHYSICAL THERAPIST

## 2025-05-01 PROCEDURE — 97112 NEUROMUSCULAR REEDUCATION: CPT | Performed by: PHYSICAL THERAPIST

## 2025-05-01 PROCEDURE — 97140 MANUAL THERAPY 1/> REGIONS: CPT | Performed by: PHYSICAL THERAPIST

## 2025-05-01 PROCEDURE — 97530 THERAPEUTIC ACTIVITIES: CPT | Performed by: PHYSICAL THERAPIST

## 2025-05-01 NOTE — PROGRESS NOTES
"Daily Note     Today's date: 2025  Patient name: Rosalba Chacon  : 1975  MRN: [id number]  Referring provider: Kentrell Brunner MD  Dx:   Encounter Diagnosis     ICD-10-CM    1. Acute pain of right knee  M25.561                      Subjective: Patient reports not much soreness after last visit. Notes she still gets pain in heel.     Objective: See treatment diary below     Educated to complete balance exercises and some strengthening exercises at home    Precautions: SEE SUBJECTIVE FOR EXTENSIVE KNEE INJURY HISTORY/CURRENT INJURY    Manuals 4/15 4/17 4/29 5/1   L knee PROM MK MK MK MK   Patellar mobs MK MK MK    B knee mx work              Neuro Re-Ed       Hip hikes              SLS 3x 25\" holds 3x20\" holds grn  3x20\" holds blue  3x30\" holds blue           Ther Ex       SLR 2x12 2x12 5 lbs 3x10 5 lbs 3x10 5 lbs   Prone donkey kicks       S/l bicycles       bike 5 min 5 min 5 min 5 min   Leg press  3x10 65 lbs 2x12 85 lbs -    Supine hip flexor/ quad stretch 3x30\" holds  3x30\" holds 3x30\" holds -   prostretch       LAQ 3x10 7.5 lbs 2x12 10 lbs  3x10 10 lbs  3x12 10 lbs    [place] hamstring 3x10 7.5 lbs    2x12 20 lbs titan B   Ther Activity       Step up/down 2x12 6\" down 2x12 6\" down      squats 2x10 wall squats 2x12   2x12   Lateral step downs   2x10 4\"  2x10 4\"    lunges    15x 8\" forward       Assessment: Tolerated treatment well. Patient demonstrated fatigue post treatment, exhibited good technique with therapeutic exercises, and would benefit from continued PT    Plan: Continue per plan of care.  Progress treatment as tolerated.     Andrew Buenrostro    "

## 2025-05-06 ENCOUNTER — APPOINTMENT (OUTPATIENT)
Dept: PHYSICAL THERAPY | Facility: CLINIC | Age: 50
End: 2025-05-06
Attending: ORTHOPAEDIC SURGERY
Payer: COMMERCIAL

## 2025-05-08 ENCOUNTER — OFFICE VISIT (OUTPATIENT)
Dept: PHYSICAL THERAPY | Facility: CLINIC | Age: 50
End: 2025-05-08
Attending: ORTHOPAEDIC SURGERY
Payer: COMMERCIAL

## 2025-05-08 DIAGNOSIS — M25.561 ACUTE PAIN OF RIGHT KNEE: Primary | ICD-10-CM

## 2025-05-08 PROCEDURE — 97110 THERAPEUTIC EXERCISES: CPT | Performed by: PHYSICAL THERAPIST

## 2025-05-08 PROCEDURE — 97112 NEUROMUSCULAR REEDUCATION: CPT | Performed by: PHYSICAL THERAPIST

## 2025-05-08 PROCEDURE — 97530 THERAPEUTIC ACTIVITIES: CPT | Performed by: PHYSICAL THERAPIST

## 2025-05-08 PROCEDURE — 97140 MANUAL THERAPY 1/> REGIONS: CPT | Performed by: PHYSICAL THERAPIST

## 2025-05-08 NOTE — PROGRESS NOTES
"Daily Note     Today's date: 2025  Patient name: Rosalba Chacon  : 1975  MRN: [id number]  Referring provider: Kentrell Brunner MD  Dx:   Encounter Diagnosis     ICD-10-CM    1. Acute pain of right knee  M25.561                      Subjective: Patient reports L foot starting to bother her more.    Objective: See treatment diary below     Educated to complete balance exercises and some strengthening exercises at home    Precautions: SEE SUBJECTIVE FOR EXTENSIVE KNEE INJURY HISTORY/CURRENT INJURY    Manuals    L knee PROM MK MK MK MK   Patellar mobs MK MK     B knee mx work              Neuro Re-Ed       Hip hikes       Foot instrinsic    Short foot/toe yoga 10x each    SLS 3x20\" holds grn  3x20\" holds blue  3x30\" holds blue  3x30\" holds blue          Ther Ex       SLR 2x12 5 lbs 3x10 5 lbs 3x10 5 lbs    Prone donkey kicks       S/l bicycles       bike 5 min 5 min 5 min 5 min   Leg press  2x12 85 lbs -     Supine hip flexor/ quad stretch 3x30\" holds 3x30\" holds -    prostretch       LAQ 2x12 10 lbs  3x10 10 lbs  3x12 10 lbs  3x12 10 lbs   [place] hamstring   2x12 20 lbs titan B 3x12 20 lbs    Ther Activity       Step up/down 2x12 6\" down       squats 2x12   2x12 2x12    Lateral step downs  2x10 4\"  2x10 4\"  2x12 4\"   lunges   15x 8\" forward  2x12 8\" fwd      Assessment: Tolerated treatment well. Patient demonstrated fatigue post treatment, exhibited good technique with therapeutic exercises, and would benefit from continued PT    Plan: Continue per plan of care.  Progress treatment as tolerated.     Andrew Buenrostro    "

## 2025-05-13 ENCOUNTER — APPOINTMENT (OUTPATIENT)
Dept: PHYSICAL THERAPY | Facility: CLINIC | Age: 50
End: 2025-05-13
Attending: ORTHOPAEDIC SURGERY
Payer: COMMERCIAL

## 2025-05-15 ENCOUNTER — OFFICE VISIT (OUTPATIENT)
Dept: PHYSICAL THERAPY | Facility: CLINIC | Age: 50
End: 2025-05-15
Attending: ORTHOPAEDIC SURGERY
Payer: COMMERCIAL

## 2025-05-15 ENCOUNTER — PATIENT MESSAGE (OUTPATIENT)
Dept: ENDOCRINOLOGY | Facility: CLINIC | Age: 50
End: 2025-05-15

## 2025-05-15 ENCOUNTER — TELEPHONE (OUTPATIENT)
Age: 50
End: 2025-05-15

## 2025-05-15 DIAGNOSIS — M79.672 LEFT FOOT PAIN: ICD-10-CM

## 2025-05-15 DIAGNOSIS — M25.561 ACUTE PAIN OF RIGHT KNEE: Primary | ICD-10-CM

## 2025-05-15 DIAGNOSIS — M25.562 ACUTE PAIN OF LEFT KNEE: ICD-10-CM

## 2025-05-15 DIAGNOSIS — E66.9 OBESITY (BMI 30-39.9): Primary | ICD-10-CM

## 2025-05-15 DIAGNOSIS — K76.0 FATTY LIVER: ICD-10-CM

## 2025-05-15 PROCEDURE — 97110 THERAPEUTIC EXERCISES: CPT | Performed by: PHYSICAL THERAPIST

## 2025-05-15 PROCEDURE — 97112 NEUROMUSCULAR REEDUCATION: CPT | Performed by: PHYSICAL THERAPIST

## 2025-05-15 PROCEDURE — 97140 MANUAL THERAPY 1/> REGIONS: CPT | Performed by: PHYSICAL THERAPIST

## 2025-05-15 PROCEDURE — 97164 PT RE-EVAL EST PLAN CARE: CPT | Performed by: PHYSICAL THERAPIST

## 2025-05-15 RX ORDER — PHENTERMINE HYDROCHLORIDE 37.5 MG/1
37.5 TABLET ORAL
Qty: 30 TABLET | Refills: 1 | Status: SHIPPED | OUTPATIENT
Start: 2025-05-15

## 2025-05-15 NOTE — PROGRESS NOTES
PT Re-Evaluation Note     Today's date: 5/15/2025  Patient name: Rosalba Chacon  : 1975  MRN: [id number]  Referring provider: Kentrell Brunner MD  Dx:   Encounter Diagnosis     ICD-10-CM    1. Acute pain of right knee  M25.561                      Subjective: Patient reports having flare ups in knees, feet, and her itchiness seems heightened.    Objective: See treatment diary below    Decreased L calf flexibility  Decreased ability to flex great toe and four other toes L LE  Decreased calf/soleus strength L LE    L knee full PROM  Decreased quad/hamstring strength L LE     Assessment: Tolerated treatment well. Patient demonstrated fatigue post treatment, exhibited good technique with therapeutic exercises, and would benefit from continued PT    STG  1. Patient will be independent with completion of HEP throughout therapy - MET  2. Patient will have full L knee ROM in 4 weeks. - MET  3. Patient will ambulate with normal gait pattern and TKE in 4 weeks. - MET  LTG  1. Patient will stand for prolonged periods without increase in symptoms so patient can complete more household tasks with less difficulty in 6 weeks. - NOT MET  2. Patient will ambulate for prolonged periods without increase in symptoms so patient can navigate throughout the community with less difficulty in 6 weeks. - NOT MET  3. Patient will navigate stairs without increase in difficulty in 6 weeks. - NOT MET    NEW GOALS  STG  1. Patient will be independent with completion of HEP throughout therapy  2. Patient will stand for prolonged periods without increase in symptoms so patient can complete more household tasks with less difficulty in 4 weeks.  3. Patient will ambulate for prolonged periods without increase in symptoms so patient can navigate throughout the community with less difficulty in 4 weeks.       Plan: Continue per plan of care.  Progress treatment as tolerated.     Precautions: SEE SUBJECTIVE FOR EXTENSIVE KNEE INJURY HISTORY/CURRENT  "INJURY    Manuals 4/29 5/1 5/8 5/15   L knee PROM MK MK MK MK   Patellar mobs MK      B knee mx work       L plantar fascia IASTM    MK and ankle distraction    Neuro Re-Ed       Hip hikes       Foot instrinsic   Short foot/toe yoga 10x each  12x 5\" holds short foot/12x toe yoga    SLS 3x20\" holds blue  3x30\" holds blue  3x30\" holds blue    Great toe/4 toe flexion    15x each slow    Ther Ex       SLR 3x10 5 lbs 3x10 5 lbs     Prone donkey kicks       S/l bicycles       bike 5 min 5 min 5 min 5 min   Leg press  -      Supine hip flexor/ quad stretch 3x30\" holds -     prostretch    Slant board 3x30\" holds    LAQ 3x10 10 lbs  3x12 10 lbs  3x12 10 lbs    Soleus wall sits    2x20    [place] hamstring  2x12 20 lbs titan B 3x12 20 lbs     Ther Activity       Step up/down       squats  2x12 2x12     Lateral step downs 2x10 4\"  2x10 4\"  2x12 4\"    lunges  15x 8\" forward  2x12 8\" fwd          Andrew Buenrostro    "

## 2025-05-15 NOTE — LETTER
May 15, 2025    Kentrell Brunner MD  [address]  [address]  [place] [address]    Patient: Rosalba Chacon   YOB: 1975   Date of Visit: 5/15/2025     Encounter Diagnosis     ICD-10-CM    1. Acute pain of right knee  M25.561       2. Acute pain of left knee  M25.562       3. Left foot pain  M79.672           Dear Dr. Kentrell Brunner MD:    Attached is a re-evaluation for Rosalba Chacon. Please review the summary from Rosalba's recent visit.     Please verify that you agree with the plan of care by signing the attached order.     If you have any questions or concerns, please do not hesitate to call.     I sincerely appreciate the opportunity to share in the care of one of your patients and hope to have another opportunity to work with you in the near future.       Sincerely,    Andrew Buenrostro, PT      Referring Provider:      I certify that I have read the below Plan of Care and certify the need for these services furnished under this plan of treatment while under my care.                    Kentrell Brunner MD  [address]  [address]  [place] [place] [facility] [address]  Via Fax: [phone]          PT Re-Evaluation Note     Today's date: 5/15/2025  Patient name: Rosalba Chacon  : 1975  MRN: [id number]  Referring provider: Kentrell Brunner MD  Dx:   Encounter Diagnosis     ICD-10-CM    1. Acute pain of right knee  M25.561                      Subjective: Patient reports having flare ups in knees, feet, and her itchiness seems heightened.    Objective: See treatment diary below    Decreased L calf flexibility  Decreased ability to flex great toe and four other toes L LE  Decreased calf/soleus strength L LE    L knee full PROM  Decreased quad/hamstring strength L LE     Assessment: Tolerated treatment well. Patient demonstrated fatigue post treatment, exhibited good technique with therapeutic exercises, and would benefit from continued PT    STG  1. Patient will be independent with completion of  "HEP throughout therapy - MET  2. Patient will have full L knee ROM in 4 weeks. - MET  3. Patient will ambulate with normal gait pattern and TKE in 4 weeks. - MET  LTG  1. Patient will stand for prolonged periods without increase in symptoms so patient can complete more household tasks with less difficulty in 6 weeks. - NOT MET  2. Patient will ambulate for prolonged periods without increase in symptoms so patient can navigate throughout the community with less difficulty in 6 weeks. - NOT MET  3. Patient will navigate stairs without increase in difficulty in 6 weeks. - NOT MET    NEW GOALS  STG  1. Patient will be independent with completion of HEP throughout therapy  2. Patient will stand for prolonged periods without increase in symptoms so patient can complete more household tasks with less difficulty in 4 weeks.  3. Patient will ambulate for prolonged periods without increase in symptoms so patient can navigate throughout the community with less difficulty in 4 weeks.       Plan: Continue per plan of care.  Progress treatment as tolerated.     Precautions: SEE SUBJECTIVE FOR EXTENSIVE KNEE INJURY HISTORY/CURRENT INJURY    Manuals 4/29 5/1 5/8 5/15   L knee PROM MK MK MK MK   Patellar mobs MK      B knee mx work       L plantar fascia IASTM    MK and ankle distraction    Neuro Re-Ed       Hip hikes       Foot instrinsic   Short foot/toe yoga 10x each  12x 5\" holds short foot/12x toe yoga    SLS 3x20\" holds blue  3x30\" holds blue  3x30\" holds blue    Great toe/4 toe flexion    15x each slow    Ther Ex       SLR 3x10 5 lbs 3x10 5 lbs     Prone donkey kicks       S/l bicycles       bike 5 min 5 min 5 min 5 min   Leg press  -      Supine hip flexor/ quad stretch 3x30\" holds -     prostretch    Slant board 3x30\" holds    LAQ 3x10 10 lbs  3x12 10 lbs  3x12 10 lbs    Soleus wall sits    2x20    [place] hamstring  2x12 20 lbs titan B 3x12 20 lbs     Ther Activity       Step up/down       squats  2x12 2x12     Lateral step " "downs 2x10 4\"  2x10 4\"  2x12 4\"    lunges  15x 8\" forward  2x12 8\" rik Buenrostro                  "

## 2025-05-15 NOTE — TELEPHONE ENCOUNTER
PA for Phentermine 37.5mg tab SUBMITTED to [facility]    via    []CMM-KEY:   [x]Surescripts-Case ID # [id number]   []Availity-Auth ID # NDC #   []Faxed to plan   []Other website   []Phone call Case ID #     []PA sent as URGENT    All office notes, labs and other pertaining documents and studies sent. Clinical questions answered. Awaiting determination from insurance company.     Turnaround time for your insurance to make a decision on your Prior Authorization can take 7-21 business days.

## 2025-05-16 NOTE — TELEPHONE ENCOUNTER
PA for Phentermine 37.5mg tab APPROVED     Date(s) approved 04/15/2025-05/15/2026      Patient advised by          []MyChart Message  []Phone call   [x]LMOM  []L/M to call office as no active Communication consent on file  []Unable to leave detailed message as VM not approved on Communication consent       Pharmacy advised by    [x]Fax  []Phone call  []Secure Chat    Specialty Pharmacy    []     Approval letter scanned into Media Yes

## 2025-05-19 ENCOUNTER — TELEPHONE (OUTPATIENT)
Dept: ENDOCRINOLOGY | Facility: CLINIC | Age: 50
End: 2025-05-19

## 2025-05-20 ENCOUNTER — APPOINTMENT (OUTPATIENT)
Dept: PHYSICAL THERAPY | Facility: CLINIC | Age: 50
End: 2025-05-20
Attending: ORTHOPAEDIC SURGERY
Payer: COMMERCIAL

## 2025-05-21 ENCOUNTER — PATIENT MESSAGE (OUTPATIENT)
Dept: RHEUMATOLOGY | Facility: CLINIC | Age: 50
End: 2025-05-21

## 2025-05-21 ENCOUNTER — OFFICE VISIT (OUTPATIENT)
Dept: RHEUMATOLOGY | Facility: CLINIC | Age: 50
End: 2025-05-21

## 2025-05-21 VITALS — BODY MASS INDEX: 31.02 KG/M2 | SYSTOLIC BLOOD PRESSURE: 132 MMHG | HEIGHT: 68 IN | DIASTOLIC BLOOD PRESSURE: 84 MMHG

## 2025-05-21 DIAGNOSIS — R21 RASH: ICD-10-CM

## 2025-05-21 DIAGNOSIS — I73.00 RAYNAUD'S DISEASE WITHOUT GANGRENE: ICD-10-CM

## 2025-05-21 DIAGNOSIS — R76.8 POSITIVE ANA (ANTINUCLEAR ANTIBODY): ICD-10-CM

## 2025-05-21 DIAGNOSIS — M32.9 SYSTEMIC LUPUS ERYTHEMATOSUS, UNSPECIFIED SLE TYPE, UNSPECIFIED ORGAN INVOLVEMENT STATUS (HCC): Primary | ICD-10-CM

## 2025-05-21 RX ORDER — DOXYCYCLINE 100 MG/1
100 CAPSULE ORAL DAILY
COMMUNITY
Start: 2025-04-24

## 2025-05-21 RX ORDER — PREDNISONE 5 MG/1
TABLET ORAL
Qty: 70 TABLET | Refills: 0 | Status: SHIPPED | OUTPATIENT
Start: 2025-05-21

## 2025-05-21 RX ORDER — AMLODIPINE BESYLATE 2.5 MG/1
2.5 TABLET ORAL
Qty: 30 TABLET | Refills: 6 | Status: SHIPPED | OUTPATIENT
Start: 2025-05-21

## 2025-05-21 RX ORDER — TIRZEPATIDE 5 MG/.5ML
INJECTION, SOLUTION SUBCUTANEOUS
COMMUNITY
Start: 2025-05-15

## 2025-05-21 NOTE — TELEPHONE ENCOUNTER
Cover my meds Phentermine HCL 37.5mg  Prio auth follow up  [id number]  [facility]  Prior auth form  See media

## 2025-05-21 NOTE — PROGRESS NOTES
Name: Rosalba Chacon      : 1975      MRN: [id number]  Encounter Provider: Jama Brown MD  Encounter Date: 2025   Encounter department: [facility] RHEUMATOLOGY [facility]  :  Assessment & Plan  Systemic lupus erythematosus, unspecified SLE type, unspecified organ involvement status (HCC)  -Presented complaining of bilateral knee pain and hand aching along with new rash  - Previously positive JENNIFER and anti-Ro and anti-La antibodies  -Concern for lupus, will check lupus activity labs  -Will also trial prednisone taper and assess if has improvement  -Will consider Plaquenil based on lab results  -Will continue ibuprofen as needed  Orders:    Sedimentation rate, automated    Urinalysis with microscopic; Future    Protein / creatinine ratio, urine; Future    C4 complement    C3 complement    Anti-DNA antibody, double-stranded    C-reactive protein    Hepatic function panel    predniSONE 5 mg tablet; 4 tabs x7 days, then 3 tabs x7 days, then 2 tabs x7 days, then 1 tab x7 days, then stop.    Positive JENNIFER (antinuclear antibody)  - Previously positive JENNIFER with positive anti-Ro and anti-La antibodies  -Now complaining of symptoms including hand pain, knee pain and Raynaud's symptoms  -Will further workup with lupus activity labs  Orders:    Sedimentation rate, automated    Urinalysis with microscopic; Future    Protein / creatinine ratio, urine; Future    C4 complement    C3 complement    Anti-DNA antibody, double-stranded    C-reactive protein    Hepatic function panel    Rash  -states she has episodes of rashes that are erythematous and become raised hives that are itchy but resolve after taking antihistamine.  - Possible allergic reaction of unknown cause versus cutaneous lupus with positive Sjogren antibodies  -As mentioned above, we will start prednisone taper and check lupus activity labs, consider Plaquenil based on results       Raynaud's disease without gangrene  - Reports hand and feet  "change in color and pain with cold  -Discussed keeping hands warm and will start amlodipine low-dose and titrate up as able and based on response  Orders:    amLODIPine (NORVASC) 2.5 mg tablet; Take 1 tablet (2.5 mg total) by mouth daily at bedtime              History of Present Illness   Rosalba Chacon is a 50 y.o. female w/ PMH hypothyroidism presenting to establish care with rheumatology.  She complains of bilateral knee pain.  States she has had bilateral ACL repairs fall years ago and fall again a February when she had MRI that showed torn ACL repair.  She denies any hand joint swelling and tenderness. She states she gets hand aches. She states she has difficulty with walking due to knee pain. She also states she has episodes of rashes that are erythematous and become raised hives that are itchy but resolve after taking antihistamine.  She has previously had labs checked including EJNNIFER, Sjogren's antibodies which were positive in the past.  She is not they were checked.  She also complains of painful cold hands and toes turn blue.  States she is active but has been active due to pain particularly in her knees.  She states she was also Lyme positive and is completing 1 month course of doxycycline.    HPI   Review of Systems   Constitutional:  Positive for fatigue. Negative for chills and fever.   Respiratory:  Positive for shortness of breath. Negative for wheezing.    Cardiovascular:  Negative for chest pain and leg swelling.   Gastrointestinal:  Negative for abdominal distention, abdominal pain, constipation, diarrhea and vomiting.   Genitourinary:  Negative for difficulty urinating.   Musculoskeletal:  Positive for arthralgias. Negative for joint swelling, myalgias and neck pain.   Skin:  Positive for rash.           Objective   /84   Ht 5' 8\" (1.727 m)   BMI 31.02 kg/m²     Physical Exam  Vitals and nursing note reviewed.   Constitutional:       General: She is not in acute distress.     Appearance: " She is well-developed.   HENT:      Head: Normocephalic and atraumatic.     Eyes:      Conjunctiva/sclera: Conjunctivae normal.       Cardiovascular:      Rate and Rhythm: Normal rate and regular rhythm.      Heart sounds: No murmur heard.  Pulmonary:      Effort: Pulmonary effort is normal. No respiratory distress.      Breath sounds: Normal breath sounds.   Abdominal:      Palpations: Abdomen is soft.      Tenderness: There is no abdominal tenderness.     Musculoskeletal:         General: No swelling.      Cervical back: Neck supple.      Right lower leg: No edema.      Left lower leg: No edema.      Comments: Mild creptitis noted on knee b/l, mild effusion  No hand deformity or joint swelling noted     Skin:     General: Skin is warm and dry.      Capillary Refill: Capillary refill takes less than 2 seconds.     Neurological:      Mental Status: She is alert.     Psychiatric:         Mood and Affect: Mood normal.         Recent labs:  Lab Results   Component Value Date/Time    SODIUM 141 07/05/2024 06:08 AM    K 4.4 07/05/2024 06:08 AM    BUN 12 07/05/2024 06:08 AM    CREATININE 0.7 07/05/2024 06:08 AM    GLUC 99 07/05/2024 06:08 AM    CALCIUM 9.4 07/05/2024 06:08 AM    AST 17 07/05/2024 06:08 AM    ALT 11 07/05/2024 06:08 AM    ALB 4 07/05/2024 06:08 AM    TP 6.7 07/05/2024 06:08 AM    EGFR 106 07/05/2024 06:08 AM    EGFR 99 07/10/2020 06:57 AM     Lab Results   Component Value Date/Time    HGB 14.8 (H) 04/08/2025 06:38 AM    HGB 15.2 09/26/2024 10:08 AM    WBC 6.7 04/08/2025 06:38 AM    WBC 7.23 09/26/2024 10:08 AM     04/08/2025 06:38 AM     09/26/2024 10:08 AM     Lab Results   Component Value Date/Time    TFN6OEMNIBCE 1.206 09/19/2024 07:15 AM

## 2025-05-21 NOTE — TELEPHONE ENCOUNTER
PA for PHENTERMINE 37.5MG SUBMITTED to [facility]    via    [x]CMM-KEY: [id number]  []Surescripts-Case ID #   []Availity-Auth ID # NDC #   []Faxed to plan   []Other website   []Phone call Case ID #     [x]PA sent as URGENT    All office notes, labs and other pertaining documents and studies sent. Clinical questions answered. Awaiting determination from insurance company.     Turnaround time for your insurance to make a decision on your Prior Authorization can take 7-21 business days.

## 2025-05-21 NOTE — PATIENT INSTRUCTIONS
Do labs  Start prednisone taper  Will possibly start hydroxychloroquine    Return to clinic in 6 months

## 2025-05-21 NOTE — TELEPHONE ENCOUNTER
PA for PHENTERMINE 37.5MG NOT REQUIRED     Reason (screenshot if applicable)          Patient advised by          [] MyChart Message  [] Phone call   []LMOM  []L/M to call office as no active Communication consent on file  []Unable to leave detailed message as VM not approved on Communication consent       Pharmacy advised by    []Fax  [x]Phone call PATIENT PICKED UP

## 2025-05-22 ENCOUNTER — APPOINTMENT (OUTPATIENT)
Dept: PHYSICAL THERAPY | Facility: CLINIC | Age: 50
End: 2025-05-22
Attending: ORTHOPAEDIC SURGERY
Payer: COMMERCIAL

## 2025-05-23 NOTE — PATIENT COMMUNICATION
Pt calling in regards to results of labs pt requesting someone call her to go over in detail. Also pt states that it is her 3rd day on prednisone and her L knee is still in a lot of pain and wondering if this is normal. Pt requesting a call back before we go into the holiday weekend.

## 2025-05-26 ENCOUNTER — NURSE TRIAGE (OUTPATIENT)
Dept: RHEUMATOLOGY | Facility: CLINIC | Age: 50
End: 2025-05-26

## 2025-05-29 ENCOUNTER — APPOINTMENT (OUTPATIENT)
Dept: PHYSICAL THERAPY | Facility: CLINIC | Age: 50
End: 2025-05-29
Attending: ORTHOPAEDIC SURGERY
Payer: COMMERCIAL

## 2025-05-30 ENCOUNTER — PATIENT MESSAGE (OUTPATIENT)
Dept: RHEUMATOLOGY | Facility: CLINIC | Age: 50
End: 2025-05-30

## 2025-05-30 ENCOUNTER — TELEPHONE (OUTPATIENT)
Age: 50
End: 2025-05-30

## 2025-05-30 DIAGNOSIS — M32.9 SYSTEMIC LUPUS ERYTHEMATOSUS, UNSPECIFIED SLE TYPE, UNSPECIFIED ORGAN INVOLVEMENT STATUS (HCC): Primary | ICD-10-CM

## 2025-05-30 LAB
ALBUMIN SERPL-MCNC: 4.3 G/DL (ref 3.5–5.7)
ALP SERPL-CCNC: 49 U/L (ref 35–120)
ALT SERPL-CCNC: 14 U/L
ANION GAP SERPL CALCULATED.3IONS-SCNC: 8 MMOL/L (ref 3–11)
AST SERPL-CCNC: 15 U/L
BASOPHILS # BLD AUTO: 0.1 THOU/CMM (ref 0–0.1)
BASOPHILS NFR BLD AUTO: 1 %
BILIRUB SERPL-MCNC: 0.7 MG/DL (ref 0.2–1)
BUN SERPL-MCNC: 9 MG/DL (ref 7–25)
CALCIUM SERPL-MCNC: 9.5 MG/DL (ref 8.5–10.5)
CHLORIDE SERPL-SCNC: 102 MMOL/L (ref 100–109)
CO2 SERPL-SCNC: 29 MMOL/L (ref 21–31)
CREAT SERPL-MCNC: 0.78 MG/DL (ref 0.4–1.1)
CYTOLOGY CMNT CVX/VAG CYTO-IMP: NORMAL
DIFFERENTIAL METHOD BLD: ABNORMAL
EOSINOPHIL # BLD AUTO: 0.1 THOU/CMM (ref 0–0.5)
EOSINOPHIL NFR BLD AUTO: 1 %
ERYTHROCYTE [DISTWIDTH] IN BLOOD BY AUTOMATED COUNT: 12.6 % (ref 12–16)
GFR/BSA.PRED SERPLBLD CYS-BASED-ARV: 93 ML/MIN/{1.73_M2}
GLUCOSE SERPL-MCNC: 81 MG/DL (ref 65–99)
HCT VFR BLD AUTO: 45.2 % (ref 35–43)
HGB BLD-MCNC: 15.2 G/DL (ref 11.5–14.5)
LYMPHOCYTES # BLD AUTO: 1.4 THOU/CMM (ref 1–3)
LYMPHOCYTES NFR BLD AUTO: 14 %
MCH RBC QN AUTO: 28.8 PG (ref 26–34)
MCHC RBC AUTO-ENTMCNC: 33.5 G/DL (ref 32–37)
MCV RBC AUTO: 86 FL (ref 80–100)
MONOCYTES # BLD AUTO: 0.6 THOU/CMM (ref 0.3–1)
MONOCYTES NFR BLD AUTO: 6 %
NEUTROPHILS # BLD AUTO: 8 THOU/CMM (ref 1.8–7.8)
NEUTROPHILS NFR BLD AUTO: 78 %
PLATELET # BLD AUTO: 304 THOU/CMM (ref 140–350)
PMV BLD REES-ECKER: 8.5 FL (ref 7.5–11.3)
POTASSIUM SERPL-SCNC: 4.4 MMOL/L (ref 3.5–5.2)
PROT SERPL-MCNC: 6.9 G/DL (ref 6.3–8.3)
RBC # BLD AUTO: 5.26 MILL/CMM (ref 3.7–4.7)
SODIUM SERPL-SCNC: 139 MMOL/L (ref 135–145)
WBC # BLD AUTO: 10.2 THOU/CMM (ref 4–10)

## 2025-05-30 NOTE — PATIENT COMMUNICATION
Pt calling and states that she was confused by providers message. She is asking if she should complete blood work today or if she should  prednisone and hold off on labs. Please advise

## 2025-05-30 NOTE — TELEPHONE ENCOUNTER
"REASON FOR CONVERSATION: Still not feeling well    SYMPTOMS: C/O Continuing B/L knee pain. L>R. Feet intermittent numbness, get hot then cold.  Continued swelling. Denies redness, no documented fever but \"felt warm\" and had some chills. Taking tylenol every 8 hours.  Done with medrol 5/31/2025.    Will get labs next week.    Confirmed [facility] pharmacy.     OTHER HEALTH INFORMATION: See Recombine messages attached that were sent 5/29/2025 and 5/30/2025 .    PROTOCOL DISPOSITION: Discuss with Provider and Call Back Patient (overriding Home Care)    CARE ADVICE PROVIDED: Continue medications, heat/cold therapy for knees. Call if worsens or fever develops.    PRACTICE FOLLOW-UP: Provider Review      Reason for Disposition   Knee pain    Answer Assessment - Initial Assessment Questions  1. LOCATION and RADIATION: \"Where is the pain located?\"       Left knee    Left Foot    2. QUALITY: \"What does the pain feel like?\"  (e.g., sharp, dull, aching, burning)      Dull, aching.  Feet can feel numb, get hot then cold    3. SEVERITY: \"How bad is the pain?\" \"What does it keep you from doing?\"   (Scale 1-10; or mild, moderate, severe)      7-9/10  by end of day    4. ONSET: \"When did the pain start?\" \"Does it come and go, or is it there all the time?\"      3 weeks    5. RECURRENT: \"Have you had this pain before?\" If Yes, ask: \"When, and what happened then?\"      Denies    6. SETTING: \"Has there been any recent work, exercise or other activity that involved that part of the body?\"       Denies    7. AGGRAVATING FACTORS: \"What makes the knee pain worse?\" (e.g., walking, climbing stairs, running)      Walking    8. ASSOCIATED SYMPTOMS: \"Is there any swelling or redness of the knee?\"      Swelling. Denies redness    9. OTHER SYMPTOMS: \"Do you have any other symptoms?\" (e.g., chest pain, difficulty breathing, fever, calf pain)      Felt warm did not take temperature    10. PREGNANCY: \"Is there any chance you are pregnant?\" \"When was your " "last menstrual period?\"        Denies  LMP 2 months    Protocols used: Knee Pain-Adult-[place]    "

## 2025-05-30 NOTE — TELEPHONE ENCOUNTER
Sent below message to her on [employer] which she hasn't read yet, please call and relay to her

## 2025-05-30 NOTE — TELEPHONE ENCOUNTER
Pt requesting a copy of Procalcitonin lab be sent via [employer]. Lab sent via [employer] as requested.    Instructed pt on how to print documents from her [employer]. Reviewed labs and which needed to be fasting. Patient had no further questions and/or concerns at this time.

## 2025-06-03 ENCOUNTER — PATIENT MESSAGE (OUTPATIENT)
Dept: RHEUMATOLOGY | Facility: CLINIC | Age: 50
End: 2025-06-03

## 2025-06-04 ENCOUNTER — OFFICE VISIT (OUTPATIENT)
Dept: RHEUMATOLOGY | Facility: CLINIC | Age: 50
End: 2025-06-04
Payer: COMMERCIAL

## 2025-06-04 VITALS
HEART RATE: 93 BPM | OXYGEN SATURATION: 100 % | HEIGHT: 68 IN | WEIGHT: 188 LBS | BODY MASS INDEX: 28.49 KG/M2 | DIASTOLIC BLOOD PRESSURE: 82 MMHG | SYSTOLIC BLOOD PRESSURE: 120 MMHG

## 2025-06-04 DIAGNOSIS — R76.8 POSITIVE ANA (ANTINUCLEAR ANTIBODY): ICD-10-CM

## 2025-06-04 DIAGNOSIS — R21 RASH: ICD-10-CM

## 2025-06-04 DIAGNOSIS — M32.9 SYSTEMIC LUPUS ERYTHEMATOSUS, UNSPECIFIED SLE TYPE, UNSPECIFIED ORGAN INVOLVEMENT STATUS (HCC): ICD-10-CM

## 2025-06-04 DIAGNOSIS — I73.00 RAYNAUD'S DISEASE WITHOUT GANGRENE: ICD-10-CM

## 2025-06-04 DIAGNOSIS — A69.23 LYME ARTHRITIS (HCC): ICD-10-CM

## 2025-06-04 DIAGNOSIS — M17.12 ARTHRITIS OF LEFT KNEE: Primary | ICD-10-CM

## 2025-06-04 PROCEDURE — 99214 OFFICE O/P EST MOD 30 MIN: CPT | Performed by: INTERNAL MEDICINE

## 2025-06-04 RX ORDER — AMOXICILLIN 500 MG/1
500 CAPSULE ORAL EVERY 8 HOURS SCHEDULED
Qty: 90 CAPSULE | Refills: 0 | Status: SHIPPED | OUTPATIENT
Start: 2025-06-04 | End: 2025-07-04

## 2025-06-04 NOTE — PATIENT COMMUNICATION
Call from the patient, verifying that she does have an appointment today with Dr. Brown at 1630 as she stated it did not yet post to her MyChart. Confirmed that she does have that appointment; confirmed time and location with the patient. No further questions.

## 2025-06-04 NOTE — PATIENT INSTRUCTIONS
Do left knee ultrasound and MRI  Can take amoxicillin course  Continue prednisone taper  Consider starting hydroxychloroquine twice a day  Will be seeing outside Orthopedics  Try Biotene products as needed for dry mouth    Return to clinic in 6 months

## 2025-06-05 ENCOUNTER — PATIENT MESSAGE (OUTPATIENT)
Dept: RHEUMATOLOGY | Facility: CLINIC | Age: 50
End: 2025-06-05

## 2025-06-05 ENCOUNTER — APPOINTMENT (OUTPATIENT)
Dept: LAB | Facility: CLINIC | Age: 50
End: 2025-06-05
Payer: COMMERCIAL

## 2025-06-05 ENCOUNTER — APPOINTMENT (OUTPATIENT)
Dept: LAB | Facility: CLINIC | Age: 50
End: 2025-06-05
Attending: INTERNAL MEDICINE
Payer: COMMERCIAL

## 2025-06-05 ENCOUNTER — HOSPITAL ENCOUNTER (OUTPATIENT)
Dept: ULTRASOUND IMAGING | Facility: HOSPITAL | Age: 50
Discharge: HOME | End: 2025-06-05
Attending: INTERNAL MEDICINE
Payer: COMMERCIAL

## 2025-06-05 DIAGNOSIS — E53.8 VITAMIN B 12 DEFICIENCY: ICD-10-CM

## 2025-06-05 DIAGNOSIS — A69.23 LYME ARTHRITIS (HCC): ICD-10-CM

## 2025-06-05 DIAGNOSIS — D75.1 POLYCYTHEMIA: ICD-10-CM

## 2025-06-05 DIAGNOSIS — M32.9 SYSTEMIC LUPUS ERYTHEMATOSUS, UNSPECIFIED SLE TYPE, UNSPECIFIED ORGAN INVOLVEMENT STATUS (HCC): ICD-10-CM

## 2025-06-05 DIAGNOSIS — E55.9 VITAMIN D DEFICIENCY: ICD-10-CM

## 2025-06-05 DIAGNOSIS — E03.9 ACQUIRED HYPOTHYROIDISM: ICD-10-CM

## 2025-06-05 LAB — RHEUMATOID FACT SERPL-ACNC: <10 IU/ML

## 2025-06-05 PROCEDURE — 86705 HEP B CORE ANTIBODY IGM: CPT

## 2025-06-05 PROCEDURE — 36415 COLL VENOUS BLD VENIPUNCTURE: CPT | Performed by: INTERNAL MEDICINE

## 2025-06-05 PROCEDURE — 82595 ASSAY OF CRYOGLOBULIN: CPT

## 2025-06-05 PROCEDURE — 86431 RHEUMATOID FACTOR QUANT: CPT | Performed by: INTERNAL MEDICINE

## 2025-06-05 PROCEDURE — 76882 US LMTD JT/FCL EVL NVASC XTR: CPT

## 2025-06-05 PROCEDURE — 86704 HEP B CORE ANTIBODY TOTAL: CPT

## 2025-06-05 PROCEDURE — 87340 HEPATITIS B SURFACE AG IA: CPT

## 2025-06-05 PROCEDURE — 86200 CCP ANTIBODY: CPT

## 2025-06-05 PROCEDURE — 86235 NUCLEAR ANTIGEN ANTIBODY: CPT | Performed by: INTERNAL MEDICINE

## 2025-06-05 PROCEDURE — 86803 HEPATITIS C AB TEST: CPT

## 2025-06-05 PROCEDURE — 86480 TB TEST CELL IMMUN MEASURE: CPT

## 2025-06-05 RX ORDER — LIDOCAINE HYDROCHLORIDE 10 MG/ML
5 INJECTION, SOLUTION EPIDURAL; INFILTRATION; INTRACAUDAL; PERINEURAL ONCE
Status: DISCONTINUED | OUTPATIENT
Start: 2025-06-05 | End: 2025-06-05

## 2025-06-06 LAB
CCP AB SER IA-ACNC: 1 (ref ?–10)
ENA SS-A AB SER IA-ACNC: 9.2 U/ML (ref ?–10)
ENA SS-B IGG SER IA-ACNC: <0.6 U/ML (ref ?–10)
GAMMA INTERFERON BACKGROUND BLD IA-ACNC: 0.04 IU/ML
HBV CORE AB SER QL: NORMAL
HBV CORE IGM SER QL: NORMAL
HBV SURFACE AG SER QL: NORMAL
HCV AB SER QL: NORMAL
M TB IFN-G BLD-IMP: NEGATIVE
M TB IFN-G CD4+ BCKGRND COR BLD-ACNC: 0.01 IU/ML
M TB IFN-G CD4+ BCKGRND COR BLD-ACNC: 0.02 IU/ML
MITOGEN IGNF BCKGRD COR BLD-ACNC: 3.35 IU/ML

## 2025-06-09 ENCOUNTER — TELEPHONE (OUTPATIENT)
Dept: RHEUMATOLOGY | Facility: CLINIC | Age: 50
End: 2025-06-09

## 2025-06-09 ENCOUNTER — PATIENT MESSAGE (OUTPATIENT)
Dept: RHEUMATOLOGY | Facility: CLINIC | Age: 50
End: 2025-06-09

## 2025-06-09 ENCOUNTER — HOSPITAL ENCOUNTER (OUTPATIENT)
Dept: MRI IMAGING | Facility: HOSPITAL | Age: 50
Discharge: HOME | End: 2025-06-09
Attending: INTERNAL MEDICINE
Payer: COMMERCIAL

## 2025-06-09 DIAGNOSIS — M25.562 ACUTE PAIN OF LEFT KNEE: ICD-10-CM

## 2025-06-09 DIAGNOSIS — M25.562 ACUTE PAIN OF LEFT KNEE: Primary | ICD-10-CM

## 2025-06-09 LAB — CRYOGLOB SER QL 1D COLD INC: NORMAL

## 2025-06-09 PROCEDURE — 73721 MRI JNT OF LWR EXTRE W/O DYE: CPT

## 2025-06-09 RX ORDER — TRAMADOL HYDROCHLORIDE 50 MG/1
50 TABLET ORAL EVERY 6 HOURS PRN
Qty: 120 TABLET | Refills: 0 | Status: SHIPPED | OUTPATIENT
Start: 2025-06-09

## 2025-06-09 NOTE — PATIENT COMMUNICATION
Pt calling in for status for MRI order. Please see My Chart message.     Asking if provider recommends contrast for clearer views. Pt states she was told by doctors that previous MRI was not clear without contrast.     Pt asked that this message be marked urgent as pain is unbearable.

## 2025-06-09 NOTE — TELEPHONE ENCOUNTER
Reached out to patient about MRI auth pending and needing to reschedule for a later date and time to ensure imaging in approved, pt expressed under standing at the time of call

## 2025-06-10 ENCOUNTER — PATIENT MESSAGE (OUTPATIENT)
Dept: RHEUMATOLOGY | Facility: CLINIC | Age: 50
End: 2025-06-10

## 2025-06-10 NOTE — PATIENT COMMUNICATION
Patient calling stating she completed the MRI without contrast yesterday and they were unable to complete with contrast. She unfortunately had a bad experience in the lab they were unable to administer contrast. She was poked several times as explained in her message.  She is asking for a call back from the provider to go over results, she is still in a lot of pain and taking pain meds every 4-6 hours, please advise.

## 2025-06-12 ENCOUNTER — TELEPHONE (OUTPATIENT)
Age: 50
End: 2025-06-12

## 2025-06-12 DIAGNOSIS — I73.00 RAYNAUD'S DISEASE WITHOUT GANGRENE: ICD-10-CM

## 2025-06-12 RX ORDER — AMLODIPINE BESYLATE 2.5 MG/1
TABLET ORAL
Qty: 90 TABLET | Refills: 1 | Status: SHIPPED | OUTPATIENT
Start: 2025-06-12

## 2025-06-12 NOTE — TELEPHONE ENCOUNTER
Received call from patient stated she has sent several messages regarding her pain and regarding the MRI results.     Patient stated did get a response this morning but she does not feel the most current MRI is correct as it differs from the one done 2/20/2025 where a  possible tear was mentioned.     Patient then stated she would like a clearer idea of her autoimmune disease because she cannot walk more than a mile a day and  she sees orthopedic doctor at [facility] on 6/18/2025 and would like to have the information to present at the visit.    Patient is requesting a call back from Dr. Brown as she feels it maybe easier to discuss her concerns then sending messages.    Patient stated she will be available all day at [phone].

## 2025-06-14 ENCOUNTER — TELEPHONE (OUTPATIENT)
Dept: RHEUMATOLOGY | Facility: CLINIC | Age: 50
End: 2025-06-14

## 2025-06-15 DIAGNOSIS — M32.9 SYSTEMIC LUPUS ERYTHEMATOSUS, UNSPECIFIED SLE TYPE, UNSPECIFIED ORGAN INVOLVEMENT STATUS (HCC): Primary | ICD-10-CM

## 2025-06-15 RX ORDER — HYDROXYCHLOROQUINE SULFATE 200 MG/1
200 TABLET, FILM COATED ORAL 2 TIMES DAILY
Qty: 60 TABLET | Refills: 6 | Status: SHIPPED | OUTPATIENT
Start: 2025-06-15 | End: 2026-01-11

## 2025-06-15 NOTE — TELEPHONE ENCOUNTER
I have spoken with the patient, will talk to [facility] directly to compare and contrast [facility] vs. [facility] knee MRI results and any further suggestions

## 2025-06-15 NOTE — TELEPHONE ENCOUNTER
Have spoken with patient; will review her MRI results with [facility] radiology and get back to her. Will see what orthopedics says regarding her knee. Also, she admitted to me that her pain worsened when she stopped prednisone and she was more reliant on tramadol then, therefore I asked her to restart the prednisone. She likely has an inflammatory component to her pain, which could be secondary to lupus. She is willing to start hydroxychloroquine after she sees orthopedics.

## 2025-06-16 PROBLEM — M32.9 SYSTEMIC LUPUS ERYTHEMATOSUS (HCC): Status: ACTIVE | Noted: 2025-06-16

## 2025-06-16 NOTE — ASSESSMENT & PLAN NOTE
Seems likely to have this with low C4, positive JENNIFER, slightly positive anti-SSA, sicca symptoms, rashes, inflammatory arthritis symptoms, and Raynaud's symptoms  Continue prednisone taper  Start hydroxychloroquine twice a day  Could have secondary Sjogren's syndrome; wants to conservatively manage sicca symptoms with over the counter rewetting eye drops and Biotene products for now  Orders:    Synovial fluid white cell count w/ diff; Future    Synovial fluid, crystal; Future    Cryoglobulin Scr W/Refl Cryoglobulin Profile, S; Future    Body fluid culture and Gram stain; Future    Chronic Hepatitis Panel; Future    Quantiferon TB Gold Plus Assay; Future    Sjogren's Antibodies; Future    RHEUMATOID FACTOR; Future    Cyclic citrul peptide antibody, IgG; Future

## 2025-06-16 NOTE — PROGRESS NOTES
Name: Rosalba Chacon      : 1975      MRN: [id number]  Encounter Provider: Jama Brown MD  Encounter Date: 2025   Encounter department: [facility] RHEUMATOLOGY [facility]  :  Assessment & Plan  Arthritis of left knee  Left knee ultrasound unremarkable so left knee MRI ordered  Declines steroid injection  Will be seeing outside Orthopedics for an opinion       Systemic lupus erythematosus, unspecified SLE type, unspecified organ involvement status (HCC)  Seems likely to have this with low C4, positive JENNIFER, slightly positive anti-SSA, sicca symptoms, rashes, inflammatory arthritis symptoms, and Raynaud's symptoms  Continue prednisone taper  Start hydroxychloroquine twice a day  Could have secondary Sjogren's syndrome; wants to conservatively manage sicca symptoms with over the counter rewetting eye drops and Biotene products for now  Orders:    Synovial fluid white cell count w/ diff; Future    Synovial fluid, crystal; Future    Cryoglobulin Scr W/Refl Cryoglobulin Profile, S; Future    Body fluid culture and Gram stain; Future    Chronic Hepatitis Panel; Future    Quantiferon TB Gold Plus Assay; Future    Sjogren's Antibodies; Future    RHEUMATOID FACTOR; Future    Cyclic citrul peptide antibody, IgG; Future    Lyme arthritis (HCC)  Less likely since has completed 30 days of doxycycline, but can try amoxicillin course to be on safe side to cover for refractory Lyme disease  Orders:    Synovial fluid white cell count w/ diff; Future    amoxicillin (AMOXIL) 500 mg capsule; Take 1 capsule (500 mg total) by mouth every 8 (eight) hours    Synovial fluid, crystal; Future    Body fluid culture and Gram stain; Future    Lyme disease, PCR; Future    Positive JENNIFER (antinuclear antibody)         Raynaud's disease without gangrene  Conservatively managed       Rash         Do left knee ultrasound and MRI  Can take amoxicillin course  Continue prednisone taper  Consider starting hydroxychloroquine  "twice a day  Will be seeing outside Orthopedics  Try Biotene products as needed for dry mouth    Return to clinic in 6 months         History of Present Illness   Rosalba Chacon is a 50 y.o. female with likely lupus presents for urgent evaluation of left knee pain. Symptoms are worsening despite NSAIDs; was not as bad with prednisone, and worse off prednisone. Has had ACL tear and surgery in past.    HPI   Review of Systems   Constitutional:  Positive for fatigue. Negative for chills and fever.   Respiratory:  Positive for shortness of breath. Negative for wheezing.    Cardiovascular:  Negative for chest pain and leg swelling.   Gastrointestinal:  Negative for abdominal distention, abdominal pain, constipation, diarrhea and vomiting.   Genitourinary:  Negative for difficulty urinating.   Musculoskeletal:  Positive for arthralgias. Negative for joint swelling, myalgias and neck pain.   Skin:  Positive for rash.           Objective   /82 (BP Location: Left arm, Patient Position: Sitting, Cuff Size: Adult)   Pulse 93   Ht 5' 7.5\" (1.715 m)   Wt 85.3 kg (188 lb)   SpO2 100%   BMI 29.01 kg/m²     Physical Exam  Vitals and nursing note reviewed.   Constitutional:       General: She is not in acute distress.     Appearance: She is well-developed.   HENT:      Head: Normocephalic and atraumatic.     Eyes:      Conjunctiva/sclera: Conjunctivae normal.       Cardiovascular:      Rate and Rhythm: Normal rate and regular rhythm.      Heart sounds: No murmur heard.  Pulmonary:      Effort: Pulmonary effort is normal. No respiratory distress.      Breath sounds: Normal breath sounds.   Abdominal:      Palpations: Abdomen is soft.      Tenderness: There is no abdominal tenderness.     Musculoskeletal:         General: Swelling and tenderness present.      Cervical back: Neck supple.      Right lower leg: No edema.      Left lower leg: No edema.      Comments: Left knee crepitus, slight swelling, and tenderness     Skin:   "   General: Skin is warm and dry.      Capillary Refill: Capillary refill takes less than 2 seconds.     Neurological:      Mental Status: She is alert.     Psychiatric:         Mood and Affect: Mood normal.         Recent labs:  Lab Results   Component Value Date/Time    SODIUM 139 05/30/2025 03:13 PM    SODIUM 141 07/05/2024 06:08 AM    K 4.4 05/30/2025 03:13 PM    K 4.4 07/05/2024 06:08 AM    BUN 9 05/30/2025 03:13 PM    BUN 12 07/05/2024 06:08 AM    CREATININE 0.78 05/30/2025 03:13 PM    CREATININE 0.7 07/05/2024 06:08 AM    GLUC 81 05/30/2025 03:13 PM    GLUC 99 07/05/2024 06:08 AM    CALCIUM 9.5 05/30/2025 03:13 PM    CALCIUM 9.4 07/05/2024 06:08 AM    AST 15 05/30/2025 03:13 PM    AST 17 07/05/2024 06:08 AM    ALT 14 05/30/2025 03:13 PM    ALT 11 07/05/2024 06:08 AM    ALB 4.3 05/30/2025 03:13 PM    ALB 4 07/05/2024 06:08 AM    TP 6.9 05/30/2025 03:13 PM    TP 6.7 07/05/2024 06:08 AM    EGFR 93 05/30/2025 03:13 PM    EGFR 106 07/05/2024 06:08 AM    EGFR 99 07/10/2020 06:57 AM     Lab Results   Component Value Date/Time    HGB 15.2 (H) 05/30/2025 03:13 PM    HGB 15.2 09/26/2024 10:08 AM    WBC 10.2 (H) 05/30/2025 03:13 PM    WBC 7.23 09/26/2024 10:08 AM     05/30/2025 03:13 PM     09/26/2024 10:08 AM     Lab Results   Component Value Date/Time    RMO2PZVCECMX 1.206 09/19/2024 07:15 AM

## 2025-06-19 ENCOUNTER — PATIENT MESSAGE (OUTPATIENT)
Dept: RHEUMATOLOGY | Facility: CLINIC | Age: 50
End: 2025-06-19

## 2025-06-19 DIAGNOSIS — M32.9 SYSTEMIC LUPUS ERYTHEMATOSUS, UNSPECIFIED SLE TYPE, UNSPECIFIED ORGAN INVOLVEMENT STATUS (HCC): Primary | ICD-10-CM

## 2025-06-27 RX ORDER — CELECOXIB 100 MG/1
100 CAPSULE ORAL 2 TIMES DAILY PRN
Qty: 60 CAPSULE | Refills: 0 | Status: SHIPPED | OUTPATIENT
Start: 2025-06-27

## 2025-07-01 ENCOUNTER — TELEPHONE (OUTPATIENT)
Age: 50
End: 2025-07-01

## 2025-07-01 NOTE — TELEPHONE ENCOUNTER
Patient calling asking that the MRI for Left knee with contrast be retracted so ortho can get approved. Asking this be completed ASAP, she has MRI scheduled for next week.

## 2025-07-04 ENCOUNTER — DOCUMENTATION (OUTPATIENT)
Age: 50
End: 2025-07-04

## 2025-07-04 ENCOUNTER — NURSE TRIAGE (OUTPATIENT)
Dept: OTHER | Facility: OTHER | Age: 50
End: 2025-07-04

## 2025-07-04 DIAGNOSIS — K13.79 MOUTH SORES: Primary | ICD-10-CM

## 2025-07-04 RX ORDER — TRIAMCINOLONE ACETONIDE 0.1 %
1 PASTE (GRAM) DENTAL 2 TIMES DAILY
Qty: 5 G | Refills: 1 | Status: SHIPPED | OUTPATIENT
Start: 2025-07-04

## 2025-07-05 ENCOUNTER — TELEPHONE (OUTPATIENT)
Dept: ENDOCRINOLOGY | Facility: CLINIC | Age: 50
End: 2025-07-05

## 2025-07-05 DIAGNOSIS — E53.8 VITAMIN B 12 DEFICIENCY: ICD-10-CM

## 2025-07-05 DIAGNOSIS — E55.9 VITAMIN D DEFICIENCY: Primary | ICD-10-CM

## 2025-07-05 NOTE — TELEPHONE ENCOUNTER
"REASON FOR CONVERSATION: Mouth Lesions    SYMPTOMS: mouth sores    OTHER HEALTH INFORMATION: patient requested on call to call in prescription or advice what to do    PROTOCOL DISPOSITION: See [facility] Within 4 Hours (Or PCP Triage)    CARE ADVICE PROVIDED: medication sent to pharmacy by on call    PRACTICE FOLLOW-UP: na          Reason for Disposition   Large blisters in mouth (i.e., fluid filled bubbles or sacs)    Answer Assessment - Initial Assessment Questions  1. LOCATION: \"Where is the ulcer located?\"         Corner of mouth  Crusted over and red/yellow in color     2. NUMBER: \"How many ulcers are there?\"         3    3. SIZE: \"How large is the ulcer?\"         Corner of mouth    4. SEVERITY: \"Are they painful?\" If Yes, ask: \"How bad is it?\"  (Scale 1-10; or mild, moderate, severe)        Burning sensation, unable to eat     5. ONSET: \"When did you first notice the ulcer?\"         4 days    6. RECURRENT SYMPTOM: \"Have you had a mouth ulcer before?\" If Yes, ask: \"When was the last time?\" and \"What happened that time?\"         no    7. CAUSE: \"What do you think is causing the mouth ulcer?\"        Unsure if related to lupus     8. OTHER SYMPTOMS: \"Do you have any other symptoms?\" (e.g., fever)        Having some extreme pain and burning lip    Also ongoing knee pain, limited range of motion  Numbness in feet      Has tried cholera oil; oragel    Protocols used: Mouth Ulcers-Adult-AH    "

## 2025-07-06 NOTE — TELEPHONE ENCOUNTER
Please let her know that my original order for MRI knee with contrast was cancelled when radiology ended up pursuing the MRI without contrast. Her ortho should be able to order the new MRI with contrast and get an additional prior Auth approved, since she never actually had a recent knee mri with contrast and my original order is no longer active.

## 2025-07-07 NOTE — TELEPHONE ENCOUNTER
Is patient feeling better, did she talk to her PCP? I don't think it's hydroxychloroquine, but she can hold it and see if the blisters resolve

## 2025-07-07 NOTE — TELEPHONE ENCOUNTER
Spoke with pt and went over message from Dr. Brown. She is aware that the original order for the MRI of knee with contrast was cancelled and per Dr. Brown her Ortho should be able to order the new MRI with contrast and get any additional prior authorization. Pt states she will reach out to her ortho get back to us.

## 2025-07-08 DIAGNOSIS — M32.9 SYSTEMIC LUPUS ERYTHEMATOSUS, UNSPECIFIED SLE TYPE, UNSPECIFIED ORGAN INVOLVEMENT STATUS (HCC): ICD-10-CM

## 2025-07-08 RX ORDER — METHYLPREDNISOLONE 4 MG/1
TABLET ORAL
Qty: 21 TABLET | Refills: 0 | Status: SHIPPED | OUTPATIENT
Start: 2025-07-08

## 2025-07-08 NOTE — TELEPHONE ENCOUNTER
"Dr. Brown    Pt states she did not speak to PCP. Pt has been using mouth ointment prescribed by Dr. Wallace on 07/04/254, states mouth  \"ulcers\" are getting better. Pt advised to call back with any questions/concerns or worsening in S/S. Patient had no further questions and/or concerns at this time.    "

## 2025-07-17 ENCOUNTER — TELEPHONE (OUTPATIENT)
Dept: ENDOCRINOLOGY | Facility: CLINIC | Age: 50
End: 2025-07-17

## 2025-07-17 ENCOUNTER — TELEPHONE (OUTPATIENT)
Age: 50
End: 2025-07-17

## 2025-07-17 DIAGNOSIS — E66.9 OBESITY (BMI 30-39.9): Primary | ICD-10-CM

## 2025-07-17 DIAGNOSIS — K76.0 FATTY LIVER: ICD-10-CM

## 2025-07-17 NOTE — LETTER
2025      ATTN: PRIOR AUTHORIZATION DEPARTMENT     Patient: Rosalba Chacon :1975 Member ID: [id number]     Re: Request for Reconsideration of ZEPBOUND      To Whom It May Concern:   Rosalba Chacon 1975 was denied coverage for the medication Zepbound on 25. The denial reason was stated as not covered due to not meeting insurance criteria. I am requesting a redetermination of the denial of coverage due to patient tried phentermine and did not have much success. Also, the patient developed worsening of Raynaud with Phentermine.      In conclusion, please reconsider the denial of Zepbound  for my patient. I would appreciate prompt review of this appeal and approval of this FDA-approved medication. I can be reached by phone at [phone]  or via fax at [phone] for additional information and discussion. Thank you.      Sincerely,   Sowmya Do MD

## 2025-07-17 NOTE — TELEPHONE ENCOUNTER
Patient called stating she would like rx to go to [facility] on file and not [facility]. Thank you.

## 2025-07-17 NOTE — TELEPHONE ENCOUNTER
Patient requests lab orders be released to [facility] so she can print out orders at home to take to lab. Please fax to [facility] [phone] if not able to release to [facility]. Thank you

## 2025-07-17 NOTE — TELEPHONE ENCOUNTER
Please fax vitamin B12, vitamin D, TSH and free T4, CBC and differential (to [facility] lab)    Sowmya Do

## 2025-07-21 RX ORDER — TIRZEPATIDE 2.5 MG/.5ML
2.5 INJECTION, SOLUTION SUBCUTANEOUS WEEKLY
Qty: 2 ML | Refills: 0 | Status: SHIPPED | OUTPATIENT
Start: 2025-07-21 | End: 2025-08-18

## 2025-07-21 NOTE — TELEPHONE ENCOUNTER
Good Day,    Please ask Provider to update Zepbound prescription. There is no diagnosis attached. Once updated please inform Endo Prior Authorization.    Thank you.

## 2025-07-21 NOTE — TELEPHONE ENCOUNTER
I have sent RX for 2.5 mg once a week for 4 weeks, if she tolerates we can increase the dose to 5 mg once a week

## 2025-07-22 ENCOUNTER — APPOINTMENT (OUTPATIENT)
Dept: LAB | Facility: CLINIC | Age: 50
End: 2025-07-22
Payer: COMMERCIAL

## 2025-07-22 DIAGNOSIS — E66.9 OBESITY (BMI 30-39.9): ICD-10-CM

## 2025-07-22 DIAGNOSIS — Z13.220 LIPID SCREENING: ICD-10-CM

## 2025-07-22 DIAGNOSIS — Z13.1 DIABETES MELLITUS SCREENING: ICD-10-CM

## 2025-07-22 DIAGNOSIS — E03.9 ACQUIRED HYPOTHYROIDISM: ICD-10-CM

## 2025-07-22 LAB
25(OH)D3+25(OH)D2 SERPL-MCNC: 50 NG/ML (ref 30–100)
BASOPHILS # BLD AUTO: 0 THOU/CMM (ref 0–0.1)
BASOPHILS NFR BLD AUTO: 1 %
DIFFERENTIAL METHOD BLD: ABNORMAL
EOSINOPHIL # BLD AUTO: 0.2 THOU/CMM (ref 0–0.5)
EOSINOPHIL NFR BLD AUTO: 4 %
ERYTHROCYTE [DISTWIDTH] IN BLOOD BY AUTOMATED COUNT: 12.8 % (ref 12–16)
EST. AVERAGE GLUCOSE BLD GHB EST-MCNC: 111 MG/DL
HBA1C MFR BLD: 5.5 %
HCT VFR BLD AUTO: 45 % (ref 35–43)
HGB BLD-MCNC: 15.1 G/DL (ref 11.5–14.5)
LYMPHOCYTES # BLD AUTO: 1 THOU/CMM (ref 1–3)
LYMPHOCYTES NFR BLD AUTO: 19 %
MCH RBC QN AUTO: 28.9 PG (ref 26–34)
MCHC RBC AUTO-ENTMCNC: 33.6 G/DL (ref 32–37)
MCV RBC AUTO: 86 FL (ref 80–100)
MONOCYTES # BLD AUTO: 0.4 THOU/CMM (ref 0.3–1)
MONOCYTES NFR BLD AUTO: 8 %
NEUTROPHILS # BLD AUTO: 3.6 THOU/CMM (ref 1.8–7.8)
NEUTROPHILS NFR BLD AUTO: 68 %
PLATELET # BLD AUTO: 272 THOU/CMM (ref 140–350)
PMV BLD REES-ECKER: 8.7 FL (ref 7.5–11.3)
RBC # BLD AUTO: 5.22 MILL/CMM (ref 3.7–4.7)
T4 FREE SERPL-MCNC: 1.12 NG/DL (ref 0.61–1.12)
TSH SERPL-ACNC: 2.54 UIU/ML (ref 0.45–5.33)
VIT B12 SERPL-MCNC: 528 PG/ML (ref 180–914)
WBC # BLD AUTO: 5.1 THOU/CMM (ref 4–10)

## 2025-07-22 PROCEDURE — 83036 HEMOGLOBIN GLYCOSYLATED A1C: CPT

## 2025-07-22 PROCEDURE — 36415 COLL VENOUS BLD VENIPUNCTURE: CPT

## 2025-07-23 NOTE — TELEPHONE ENCOUNTER
PA for zepbound 2.5mg SUBMITTED to [facility]    via    []CMM-KEY:   [x][facility]-Case ID # [id number]   []Availity-Auth ID # NDC #   []Faxed to plan   []Other website   []Phone call Case ID #     [x]PA sent as URGENT    All office notes, labs and other pertaining documents and studies sent. Clinical questions answered. Awaiting determination from insurance company.     Turnaround time for your insurance to make a decision on your Prior Authorization can take 7-21 business days.

## 2025-07-25 ENCOUNTER — TELEPHONE (OUTPATIENT)
Dept: ENDOCRINOLOGY | Facility: CLINIC | Age: 50
End: 2025-07-25

## 2025-07-25 NOTE — TELEPHONE ENCOUNTER
Pt did try Phentermine and did not have much success , also she developed, worsening of Raynods with phentermine   Can we do appeal   Thanks     Sowmya Do

## 2025-07-25 NOTE — TELEPHONE ENCOUNTER
Duplicate encounter created, please see telephone encounter from 7/17/25 regarding ZEPBOUND PA status. Please review patient's chart to see if there is already an encounter regarding the medication in question and to document anything regarding this medication in regards to anything regarding the authorization process etc before creating another encounter Thank You.

## 2025-07-25 NOTE — TELEPHONE ENCOUNTER
PA for (Zepbound) 2.5 mg  APPEALED via     []CMM  []SS  []Letter sent to insurance via fax [phone]  []Other site or means     All necessary records sent. Will await response from insurance company    Turnaround time for a decision to be made on an appeal could take up to 30 business days

## 2025-07-25 NOTE — TELEPHONE ENCOUNTER
PA for zepbound 2.5mg  DENIED    Reason:        Message sent to office clinical pool Yes    Denial letter scanned into Media Yes    We can gladly do an appeal but the process can take about 30-60 days to provide determination. Please have the office staff schedule a Peer to Peer at phone [phone]. If an appeal is truly warranted please have Provider send clinical documentation to the PA department to support the appeal.     **Please follow up with your patient regarding denial and next steps**

## 2025-07-29 DIAGNOSIS — M32.9 SYSTEMIC LUPUS ERYTHEMATOSUS, UNSPECIFIED SLE TYPE, UNSPECIFIED ORGAN INVOLVEMENT STATUS (HCC): ICD-10-CM

## 2025-07-30 RX ORDER — CELECOXIB 100 MG/1
CAPSULE ORAL
Qty: 60 CAPSULE | Refills: 2 | Status: SHIPPED | OUTPATIENT
Start: 2025-07-30

## 2025-08-08 ENCOUNTER — TELEPHONE (OUTPATIENT)
Dept: ENDOCRINOLOGY | Facility: CLINIC | Age: 50
End: 2025-08-08

## 2025-08-08 ENCOUNTER — TELEPHONE (OUTPATIENT)
Age: 50
End: 2025-08-08

## 2025-08-19 ENCOUNTER — HOSPITAL ENCOUNTER (OUTPATIENT)
Dept: NON INVASIVE DIAGNOSTICS | Facility: HOSPITAL | Age: 50
Discharge: HOME | End: 2025-08-19
Attending: PODIATRIST
Payer: COMMERCIAL

## 2025-08-19 DIAGNOSIS — I87.2 VENOUS INSUFFICIENCY: ICD-10-CM

## 2025-08-19 PROCEDURE — 93922 UPR/L XTREMITY ART 2 LEVELS: CPT | Performed by: SURGERY

## 2025-08-19 PROCEDURE — 93922 UPR/L XTREMITY ART 2 LEVELS: CPT
